# Patient Record
Sex: MALE | Race: WHITE | NOT HISPANIC OR LATINO | Employment: UNEMPLOYED | URBAN - METROPOLITAN AREA
[De-identification: names, ages, dates, MRNs, and addresses within clinical notes are randomized per-mention and may not be internally consistent; named-entity substitution may affect disease eponyms.]

---

## 2017-02-13 ENCOUNTER — ALLSCRIPTS OFFICE VISIT (OUTPATIENT)
Dept: OTHER | Facility: OTHER | Age: 52
End: 2017-02-13

## 2017-02-18 ENCOUNTER — LAB CONVERSION - ENCOUNTER (OUTPATIENT)
Dept: OTHER | Facility: OTHER | Age: 52
End: 2017-02-18

## 2017-02-18 LAB
A/G RATIO (HISTORICAL): 1.4 (CALC) (ref 1–2.5)
ALBUMIN SERPL BCP-MCNC: 4.2 G/DL (ref 3.6–5.1)
ALP SERPL-CCNC: 47 U/L (ref 40–115)
ALT SERPL W P-5'-P-CCNC: 31 U/L (ref 9–46)
AST SERPL W P-5'-P-CCNC: 28 U/L (ref 10–35)
BILIRUB SERPL-MCNC: 0.4 MG/DL (ref 0.2–1.2)
BUN SERPL-MCNC: 22 MG/DL (ref 7–25)
BUN/CREA RATIO (HISTORICAL): 16 (CALC) (ref 6–22)
CALCIUM SERPL-MCNC: 9 MG/DL (ref 8.6–10.3)
CHLORIDE SERPL-SCNC: 104 MMOL/L (ref 98–110)
CHOLEST SERPL-MCNC: 186 MG/DL (ref 125–200)
CHOLEST/HDLC SERPL: 4 (CALC)
CO2 SERPL-SCNC: 27 MMOL/L (ref 20–31)
COTININE (HISTORICAL): <2 NG/ML
CREAT SERPL-MCNC: 1.34 MG/DL (ref 0.7–1.33)
EGFR AFRICAN AMERICAN (HISTORICAL): 71 ML/MIN/1.73M2
EGFR-AMERICAN CALC (HISTORICAL): 61 ML/MIN/1.73M2
GAMMA GLOBULIN (HISTORICAL): 2.9 G/DL (CALC) (ref 1.9–3.7)
GLUCOSE (HISTORICAL): 92 MG/DL (ref 65–99)
HBA1C MFR BLD HPLC: 5.9 % OF TOTAL HGB
HDLC SERPL-MCNC: 46 MG/DL
LDL CHOLESTEROL (HISTORICAL): 118 MG/DL (CALC)
NICOTINE (HISTORICAL): <2 NG/ML
NON-HDL-CHOL (CHOL-HDL) (HISTORICAL): 140 MG/DL (CALC)
POTASSIUM SERPL-SCNC: 4.2 MMOL/L (ref 3.5–5.3)
PROSTATE SPECIFIC ANTIGEN TOTAL (HISTORICAL): 0.2 NG/ML
SODIUM SERPL-SCNC: 140 MMOL/L (ref 135–146)
TOTAL PROTEIN (HISTORICAL): 7.1 G/DL (ref 6.1–8.1)
TRIGL SERPL-MCNC: 108 MG/DL

## 2017-02-20 ENCOUNTER — GENERIC CONVERSION - ENCOUNTER (OUTPATIENT)
Dept: OTHER | Facility: OTHER | Age: 52
End: 2017-02-20

## 2017-07-12 ENCOUNTER — ALLSCRIPTS OFFICE VISIT (OUTPATIENT)
Dept: OTHER | Facility: OTHER | Age: 52
End: 2017-07-12

## 2017-07-12 DIAGNOSIS — M10.9 GOUT: ICD-10-CM

## 2017-07-12 DIAGNOSIS — E79.0 HYPERURICEMIA WITHOUT SIGNS OF INFLAMMATORY ARTHRITIS AND TOPHACEOUS DISEASE: ICD-10-CM

## 2018-01-13 NOTE — PROGRESS NOTES
Assessment    1  Encounter for preventive health examination (V70 0) (Z00 00)   2  Hyperuricemia (790 6) (E79 0)    Plan  Health Maintenance    · (LC) Cotinine Screen ONLY, Serum; Status:Active; Requested for:30Yjv0748;    · Always use a seat belt and shoulder strap when riding or driving a motor vehicle ;  Status:Complete;   Done: 72FYE5802   · Begin or continue regular aerobic exercise  Gradually work up to at least 3 sessions of 30  minutes of exercise a week ; Status:Complete;   Done: 52ASS5455   · Decreasing the stress in your life may help your condition improve ; Status:Complete;    Done: 99ALT6942   · Eat a normal well-balanced diet ; Status:Complete;   Done: 87RWS8621   · Regular aerobic exercise can help reduce stress ; Status:Complete;   Done: 87IEC1613   · Stretch and warm up your muscles during the first 10 minutes , then cool down your  muscles for the last 10 minutes of exercise ; Status:Complete;   Done: 36JZO7772   · Use a sun block product with an SPF of 15 or more ; Status:Complete;   Done:  56UPM7014   · We recommend a colonoscopy ; Status:Complete;   Done: 37IHW8592   · We recommend routine visits to a dentist ; Status:Complete;   Done: 26FQM1120   · Call [phone] if: You have any warning signs of skin cancer ; Status:Complete;    Done: 23MTP5029   · Call 911 if: You experience a new kind of chest pain (angina) or pressure ;  Status:Complete;   Done: 33BUW8999  Health Maintenance, Hyperuricemia, Prostate cancer screening    · (1) COMPREHENSIVE METABOLIC PANEL; Status:Active; Requested for:80Cwa3546;    · (Q) CBC (INCLUDES DIFF/PLT) WITH SMEAR REVIEW; Status:Active; Requested  for:65Xgy2775;    · (Q) HEMOGLOBIN A1c WITH eAG; Status:Active; Requested for:91Evw3681;    · (Q) LIPID PANEL WITH REFLEX TO DIRECT LDL; Status:Active; Requested  for:57Lea9581;    · (Q) PSA, TOTAL WITH REFLEX TO PSA, FREE; Status:Active; Requested  for:89Chr6903;    Health Maintenance, Hyperuricemia, Prostate cancer screening, Testosterone deficiency    · (Q) TESTOSTERONE,TOTAL,MALES; Status:Active; Requested for:32Dfg1533; Health Maintenance, Testosterone deficiency    · (Q) TSH, 3RD GENERATION W/REFLEX TO FT4; Status:Active; Requested  for:45Bnr5824; Check on Zostavax for shingles  He wants labs covered as part of a yearly physical  He doesn't want to schedule a colonoscopy  Chief Complaint  Physical      History of Present Illness  HM, Adult Male: The patient is being seen for a health maintenance evaluation  The last health maintenance visit was 1 5 year(s) ago  Social History: Household members include spouse and 4 children  He is   Work status: working full time  The patient has never smoked cigarettes  He reports occasional alcohol use  He has never used illicit drugs  General Health: The patient's health since the last visit is described as good  He has regular dental visits  He complains of vision problems  Vision care includes wearing reading glasses and Plans to make appt  He denies hearing loss  Immunizations status: up to date   Doesn't want a flu shot; discussed Zostavax  Lifestyle:  He consumes a diverse and healthy diet  He does not have any weight concerns  He exercises regularly  He exercises 3 or more times per week  Exercise includes aerobic conditioning and strength training  Reproductive health:  the patient is sexually active  He denies erectile dysfunction  Screening: Prostate cancer screening includes no previous evaluation  Testicular cancer screening includes monthly self testicular examinations  Colorectal cancer screening includes a colonoscopy within the past ten years  Metabolic screening includes lipid profile performed 12/14- good  Safety elements used: seat belt, bicycle helmet, safe driving habits, sunscreen, smoke detector and carbon monoxide detector  HPI: He saw Dr Anamaria Dong last year (urology) in [place] for erectile problems   He thinks he did a PSA  Had a renal ultrasound and CT  He had a low testosterone in the past but that wasn't rechecked  Review of Systems    Constitutional: not feeling poorly and not feeling tired  Eyes: no eye pain, no dryness of the eyes, eyes not red and no purulent discharge from the eyes    The patient presents with complaints of eyesight problems (Small print issues)  ENT: Recent URI- resolved  , but no earache  Cardiovascular: no chest pain, no palpitations and no extremity edema  Respiratory: no shortness of breath, no cough and no wheezing  Gastrointestinal: no abdominal pain, no constipation, no diarrhea and no blood in stools  Genitourinary: no dysuria, no urinary hesitancy, no genital lesions, no incontinence, no nocturia and no testicular pain  Musculoskeletal: No recent gout  , but no arthralgias, no joint swelling, no myalgias and no joint stiffness  Integumentary: no rashes and no skin lesions  Neurological: no headache, no dizziness and no fainting  Psychiatric: no anxiety and no depression  Endocrine: no muscle weakness  Hematologic/Lymphatic: no tendency for easy bleeding and no tendency for easy bruising  Active Problems    1  Allergic rhinitis (477 9) (J30 9)   2   Gout (274 9) (M10 9)    Past Medical History    · History of Male erectile dysfunction (607 84) (N52 9)   · History of Wrist fracture, left (814 00) (S62 102A)    Surgical History    · History of Knee Arthroscopy With Medial Meniscectomy   · History of Surgery Vas Deferens Vasectomy    Family History    · Family history of coronary artery disease (V17 3) (Z82 49)   · Family history of gout (V18 19) (Z82 69)   · Family history of hypertension (V17 49) (Z82 49)   · Family history of malignant neoplasm of uterus (V16 49) (Z80 49)    · Family history of gout (V18 19) (Z82 69)   · Family history of hypertension (V17 49) (Z82 49)    · Family history of coronary artery disease (V17 3) (Z82 49)   · Family history of diabetes mellitus (V18 0) (Z83 3)   · Family history of gout (V18 19) (Z82 69)   · Family history of hypertension (V17 49) (Z82 49)   · Family history of malignant neoplasm of uterus (V16 49) (Z80 49)    · Family history of gout (V18 19) (Z82 69)    Social History    · Education Level: Some college   ·    · 4 children   · Never a smoker   · Occupation   · Network tech   · Social alcohol use (Z78 9)    Current Meds   1  MegaRed Omega-3 Krill Oil 500 MG Oral Capsule; One daily; Therapy: 29Ukb3566 to Recorded   2  Multi-Vitamins Oral Tablet; TAKE 1 TABLET DAILY; Therapy: 34Szy1473 to Recorded    Allergies    1  No Known Drug Allergies    Vitals   Recorded: 49Saq7530 08:08AM   Temperature 97 7 F, Oral   Heart Rate 64, L Radial   Pulse Quality Norm   Respiration 14   Respiration Quality Norm   Systolic 396, LUE, Sitting   Diastolic 88, LUE, Sitting   Height 5 ft 10 5 in   Weight 219 lb 6 08 oz   BMI Calculated 31 03   BSA Calculated 2 19   O2 Saturation 97   Pain Scale 0     Physical Exam    Constitutional   General appearance: No acute distress, well appearing and well nourished  Head and Face   Head and face: Normal     Eyes   Conjunctiva and lids: No erythema, swelling or discharge  Pupils and irises: Equal, round, reactive to light  Ears, Nose, Mouth, and Throat   External inspection of ears and nose: Normal     Otoscopic examination: Tympanic membranes translucent with normal light reflex  Canals patent without erythema  Hearing: Normal     Nasal mucosa, septum, and turbinates: Normal without edema or erythema  Lips, teeth, and gums: Normal, good dentition  Oropharynx: Normal with no erythema, edema, exudate or lesions  Neck   Neck: Supple, symmetric, trachea midline, no masses  Thyroid: Normal, no thyromegaly  Pulmonary   Respiratory effort: No increased work of breathing or signs of respiratory distress  Auscultation of lungs: Clear to auscultation      Cardiovascular Palpation of heart: Normal PMI, no thrills  Auscultation of heart: Normal rate and rhythm, normal S1 and S2, no murmurs  Carotid pulses: 2+ bilaterally  no bruit heard over the right carotid and no bruit heard over the left carotid  Abdominal aorta: Normal     Femoral pulses: 2+ bilaterally  Pedal pulses: 2+ bilaterally  Examination of extremities for edema and/or varicosities: Normal     Abdomen   Abdomen: Non-tender, no masses  Liver and spleen: No hepatomegaly or splenomegaly  Examination for hernias: No hernias appreciated  Anus, perineum, and rectum: Normal sphincter tone, no masses, no prolapse  Genitourinary   Scrotal contents: Normal testes, no masses  Penis: Normal, no lesions  Examination of the circumcised penis showed  Digital rectal exam of prostate: Normal size, no masses  Lymphatic   Palpation of lymph nodes in neck: No lymphadenopathy  Palpation of lymph nodes in groin: No lymphadenopathy  Musculoskeletal   Gait and station: Normal     Inspection/palpation of digits and nails: Normal without clubbing or cyanosis  Inspection/palpation of joints, bones, and muscles: Normal     Range of motion: Normal     Skin   Skin and subcutaneous tissue: Normal without rashes or lesions  Neurologic   Cranial nerves: Cranial nerves 2-12 intact  Cortical function: Normal mental status  Reflexes: 2+ and symmetric  Psychiatric   Judgment and insight: Normal     Orientation to person, place and time: Normal     Recent and remote memory: Intact      Mood and affect: Normal        Procedure    Procedure: Visual Acuity Test    Results: 20/20 in both eyes without corrective device, 20/25 in the right eye without corrective device, 20/30 in the left eye without corrective device      Signatures   Electronically signed by : CARMENCITA Sheriff ; Feb 24 2016  8:47AM EST                       (Author)

## 2018-01-13 NOTE — RESULT NOTES
Message   please inform pt that labs look good -- work forms completed, will be faxed this week -- ok to mail results     Verified Results  (1) COMPREHENSIVE METABOLIC PANEL [id number] 04:41AN August Avina     Test Name Result Flag Reference   GLUCOSE 92 mg/dL  65-99   Fasting reference interval   UREA NITROGEN (BUN) 22 mg/dL  7-25   CREATININE 1 34 mg/dL H 0 70-1 33   For patients >52years of age, the reference limit  for Creatinine is approximately 13% higher for people  identified as -American  eGFR NON-AFR  AMERICAN 61 mL/min/1 73m2  > OR = 60   eGFR AFRICAN AMERICAN 71 mL/min/1 73m2  > OR = 60   BUN/CREATININE RATIO 16 (calc)  6-22   SODIUM 140 mmol/L  135-146   POTASSIUM 4 2 mmol/L  3 5-5 3   CHLORIDE 104 mmol/L     CARBON DIOXIDE 27 mmol/L  20-31   CALCIUM 9 0 mg/dL  8 6-10 3   PROTEIN, TOTAL 7 1 g/dL  6 1-8 1   ALBUMIN 4 2 g/dL  3 6-5 1   GLOBULIN 2 9 g/dL (calc)  1 9-3 7   ALBUMIN/GLOBULIN RATIO 1 4 (calc)  1 0-2 5   BILIRUBIN, TOTAL 0 4 mg/dL  0 2-1 2   ALKALINE PHOSPHATASE 47 U/L     AST 28 U/L  10-35   ALT 31 U/L  9-46     (1) LIPID PANEL, FASTING [id number] 09:27AM Miriam Dixon     Test Name Result Flag Reference   CHOLESTEROL, TOTAL 186 mg/dL  125-200   HDL CHOLESTEROL 46 mg/dL  > OR = 40   TRIGLICERIDES 257 mg/dL  <150   LDL-CHOLESTEROL 118 mg/dL (calc)  <130   Desirable range <100 mg/dL for patients with CHD or  diabetes and <70 mg/dL for diabetic patients with  known heart disease  CHOL/HDLC RATIO 4 0 (calc)  < OR = 5 0   NON HDL CHOLESTEROL 140 mg/dL (calc)     Target for non-HDL cholesterol is 30 mg/dL higher than   LDL cholesterol target  (1) PSA (SCREEN) (Dx V76 44 Screen for Prostate Cancer) [id number] 09:27AM August Living     Test Name Result Flag Reference   PSA, TOTAL 0 2 ng/mL  < OR = 4 0   This test was performed using the Siemens  chemiluminescent method  Values obtained from  different assay methods cannot be used  interchangeably   PSA levels, regardless of  value, should not be interpreted as absolute  evidence of the presence or absence of disease  NICOTINE AND COTININE, LC/MS/MS, SERUM/PLASMA [id number] 09:27AM Miriam Dixon     Test Name Result Flag Reference   NICOTINE, SERUM/PLASMA <2 ng/mL     COTININE, SERUM/PLASMA <2 ng/mL     Reference Range, Serum/Plasma(ng/mL):                                 Nicotine                               Smokers: 2-10                            Nonsmokers: <=4                                 Cotinine                               Smokers:                             Nonsmokers: <=8     Individuals exposed to second-hand or passive tobacco  smoke may demonstrate concentrations of nicotine and  cotinine greater than those indicated for non-smokers  (Q) CBC (H/H, RBC, INDICES, WBC, PLT) [id number] 09:27AM Miriam Dixon     Test Name Result Flag Reference   WHITE BLOOD CELL COUNT 4 7 Thousand/uL  3 8-10 8   RED BLOOD CELL COUNT 5 02 Million/uL  4 20-5 80   HEMOGLOBIN 14 6 g/dL  13 2-17 1   HEMATOCRIT 44 3 %  38 5-50 0   MCV 88 4 fL  80 0-100 0   MCH 29 0 pg  27 0-33 0   MCHC 32 8 g/dL  32 0-36 0   RDW 13 4 %  11 0-15 0   PLATELET COUNT 839 Thousand/uL  140-400   MPV 9 1 fL  7 5-12 5   WE RECEIVED YOUR HANDWRITTEN TEST ORDER AND  PERFORMED A HEMOGRAM WITH A PLATELET WITHOUT  A DIFFERENTIAL  IF THIS IS NOT WHAT YOU INTENDED  TO ORDER, PLEASE CONTACT YOUR LOCAL CLIENT SERVICE  REPRESENTATIVE IMMEDIATELY SO THAT WE CAN   ADJUST OUR BILLING APPROPRIATELY  YOU MAY ALSO   INQUIRE ABOUT ALTERNATIVE OR ADDITIONAL TESTING            (Q) HEMOGLOBIN A1c [id number] 09:27AM Miriam Dixon   REPORT COMMENT:  ON HOLD  FASTING:YES     Test Name Result Flag Reference   HEMOGLOBIN A1c 5 9 % of total Hgb H <5 7   According to ADA guidelines, hemoglobin A1c <7 0%  represents optimal control in non-pregnant diabetic  patients  Different metrics may apply to specific  patient populations  Standards of Medical Care in  [phone]   Diabetes Care  2013;36:s11-s66     For the purpose of screening for the presence of  diabetes  <5 7%       Consistent with the absence of diabetes  5 7-6 4%    Consistent with increased risk for diabetes              (prediabetes)  >or=6 5%    Consistent with diabetes     This assay result is consistent with an increased risk  of diabetes  Currently, no consensus exists for use of hemoglobin  A1c for diagnosis of diabetes for children

## 2018-01-14 VITALS
SYSTOLIC BLOOD PRESSURE: 154 MMHG | HEART RATE: 71 BPM | DIASTOLIC BLOOD PRESSURE: 90 MMHG | RESPIRATION RATE: 14 BRPM | WEIGHT: 221.31 LBS | TEMPERATURE: 97.8 F | BODY MASS INDEX: 29.97 KG/M2 | OXYGEN SATURATION: 99 % | HEIGHT: 72 IN

## 2018-01-14 NOTE — RESULT NOTES
Verified Results  NICOTINE AND EXPANDED METABOLITES,LCMSMS,SP [id number] 08:11AM Ferny Geronimo   REPORT COMMENT:  FASTING:NO     Test Name Result Flag Reference   NICOTINE, SERUM/PLASMA <2 ng/mL     COTININE, SERUM/PLASMA <2 ng/mL     3OH COTININE,SERUM/PLASMA <2 ng/mL     Individuals exposed to second hand or passive tobacco  smoke may demonstrate concentrations of nicotine and  metabolites greater than those indicated for non-  smokers  Reference Ranges: Active                        Non-Smoker     Tobacco User                         (ng/mL)          (ng/mL)           Nicotine        <4               2-10           Cotinine        <8                    3-OH-Cotinine        <2             100-500     (Q) CBC (INCLUDES DIFF/PLT) WITH SMEAR REVIEW [id number] 06:38AM Ferny Juanpablo     Test Name Result Flag Reference   WHITE BLOOD CELL COUNT 5 9 Thousand/uL  3 8-10 8   RED BLOOD CELL COUNT 5 05 Million/uL  4 20-5 80   HEMOGLOBIN 14 6 g/dL  13 2-17 1   HEMATOCRIT 45 3 %  38 5-50 0   MCV 89 6 fL  80 0-100 0   MCH 29 0 pg  27 0-33 0   MCHC 32 3 g/dL  32 0-36 0   RDW 13 1 %  11 0-15 0   PLATELET COUNT 021 Thousand/uL  140-400   MPV 9 1 fL  7 5-11 5   ABSOLUTE NEUTROPHILS 1121 cells/uL L 8799-5417   ABSOLUTE LYMPHOCYTES 4307 cells/uL H 850-3900   ABSOLUTE MONOCYTES 354 cells/uL  200-950   ABSOLUTE EOSINOPHILS 0 cells/uL L    ABSOLUTE BASOPHILS 118 cells/uL  0-200   NEUTROPHILS 19 0 %     LYMPHOCYTES 73 0 %     MONOCYTES 6 0 %     EOSINOPHILS 0 %     BASOPHILS 2 0 %       (1) COMPREHENSIVE METABOLIC PANEL [id number] 37:43SU Ferny Geronimo     Test Name Result Flag Reference   GLUCOSE 84 mg/dL  65-99   Fasting reference interval   UREA NITROGEN (BUN) 15 mg/dL  7-25   CREATININE 1 19 mg/dL  0 70-1 33   For patients >52years of age, the reference limit  for Creatinine is approximately 13% higher for people  identified as -American  eGFR NON-AFR   AMERICAN 71 mL/min/1 73m2  > OR = 60   eGFR AFRICAN AMERICAN 82 mL/min/1 73m2  > OR = 60   BUN/CREATININE RATIO   7-93   NOT APPLICABLE (calc)   SODIUM 138 mmol/L  135-146   POTASSIUM 4 2 mmol/L  3 5-5 3   CHLORIDE 101 mmol/L     CARBON DIOXIDE 26 mmol/L  19-30   CALCIUM 9 0 mg/dL  8 6-10 3   PROTEIN, TOTAL 7 0 g/dL  6 1-8 1   ALBUMIN 4 3 g/dL  3 6-5 1   GLOBULIN 2 7 g/dL (calc)  1 9-3 7   ALBUMIN/GLOBULIN RATIO 1 6 (calc)  1 0-2 5   BILIRUBIN, TOTAL 0 7 mg/dL  0 2-1 2   ALKALINE PHOSPHATASE 43 U/L     AST 22 U/L  10-35   ALT 31 U/L  9-46     (Q) HEMOGLOBIN A1c WITH eAG [id number] 06:38AM Lauro Amaro   REPORT COMMENT:  FASTING:NO     Test Name Result Flag Reference   HEMOGLOBIN A1c 5 7 % of total Hgb H <5 7   According to ADA guidelines, hemoglobin A1c <7 0%  represents optimal control in non-pregnant diabetic  patients  Different metrics may apply to specific  patient populations  Standards of Medical Care in    Diabetes Care  2013;36:s11-s66     For the purpose of screening for the presence of  diabetes  <5 7%       Consistent with the absence of diabetes  5 7-6 4%    Consistent with increased risk for diabetes              (prediabetes)  >or=6 5%    Consistent with diabetes     This assay result is consistent with an increased risk  of diabetes  Currently, no consensus exists for use of hemoglobin  A1c for diagnosis of diabetes for children  eAG (mg/dL) 117 (calc)     eAG (mmol/L) 6 5 (calc)       (Q) LIPID PANEL WITH REFLEX TO DIRECT LDL [id number] 06:38AM Lauro Arrpretty     Test Name Result Flag Reference   CHOLESTEROL, TOTAL 188 mg/dL  125-200   HDL CHOLESTEROL 46 mg/dL  > OR = 40   TRIGLICERIDES 954 mg/dL  <150   LDL-CHOLESTEROL 120 mg/dL (calc)  <130   Desirable range <100 mg/dL for patients with CHD or  diabetes and <70 mg/dL for diabetic patients with  known heart disease     CHOL/HDLC RATIO 4 1 (calc)  < OR = 5 0   NON HDL CHOLESTEROL 142 mg/dL (calc)     Target for non-HDL cholesterol is 30 mg/dL higher than   LDL cholesterol target  (Q) PSA, TOTAL WITH REFLEX TO PSA, FREE [id number] 06:38AM Lonne Eusebio     Test Name Result Flag Reference   TOTAL PSA 0 3 ng/mL  < OR = 4 0   This test was performed using the Tracy Eileen  immunoassay method  Values obtained with other assay  methods cannot be used interchangeably  PSA levels,  regardless of value, should not be interpreted as  absolute evidence of the presence or absence of disease  (Q) Malachi Niño [id number] 06:38AM Lonne Eusebio     Test Name Result Flag Reference   TESTOSTERONE,TOTAL,MALES 214 ng/dL L 449-916   Men with clinically significant hypogonadal symptoms  and testosterone values repeatedly less than   approximately 300 ng/dL may benefit from testosterone   treatment after adequate risk and benefits counseling  In hypogonadal males, Testosterone, Total, LC/MS/MS,  is the recommended assay due to the diminished  accuracy of immunoassay at levels below 250 ng/dL  This test code (83821) must be collected in a  red-top tube with no gel  Two morning (8-10 a m )  specimens obtained on different days are recommended  by The [employer] Society for screening for  hypogonadism  (Q) TSH, 3RD GENERATION W/REFLEX TO FT4 [id number] 06:38AM Lonne Eusebio     Test Name Result Flag Reference   TSH W/REFLEX TO FT4 2 88 mIU/L  0 40-4 50     (1) URIC ACID [id number] 06:38AM Lonne Eusebio     Test Name Result Flag Reference   URIC ACID 10 0 mg/dL H 4 0-8 0   Therapeutic target for gout patients: <6 0 mg/dL       Plan  Hyperuricemia    · Allopurinol 300 MG Oral Tablet; TAKE 1 TABLET DAILY   · (1) URIC ACID; Status:Active; Requested FUD:61FSH6745;   Screen for colon cancer    · COLONOSCOPY; Status:Active;  Requested for:02Mar2016;    · *1 - 209 [address] Physician Referral  Consult   Status: Active  Requested for: 56VNR0295  Care Summary provided  : Yes  Testosterone deficiency    · 1 Susan Cortez MD, [facility] (Endocrinology) Physician Referral  Consult  Status: Active   Requested for: 22JZK0420  Care Summary provided  : Yes

## 2018-01-15 NOTE — RESULT NOTES
Message   These labs will be discussed in the office visit  Please call [facility] and make sure they are running the testosterone levels as ordered  Verified Results  (1) ADRENOCORTICOTROPHIN [id number] 02:30PM [facility]     Test Name Result Flag Reference   ACTH, PLASMA 16 pg/mL  6-50   Reference range applies only to specimens collected           between 7-10 AM      (1) CORTISOL AM SPECIMEN [id number] 02:30PM [facility]     Test Name Result Flag Reference   CORTISOL, A M  7 6 mcg/dL     Reference Range  8 a m  (7-9 a m ) Specimen: 4 0-22 0     (1) DHEA-S [id number] 02:30PM [facility]     Test Name Result Flag Reference   DHEA SULFATE 141 mcg/dL     DHEA-S values fall with advancing age  For reference, the reference intervals for 32-40 year  old patients are:     Male:   106-464 mcg/dL  Female:   mcg/dL     (1) [facility] [id number] 02:30PM [facility]     Test Name Result Flag Reference   [facility] 5 2 mIU/mL  1 6-8 0     (1) RENAL FUNCTION PANEL [id number] 02:30PM [facility]     Test Name Result Flag Reference   GLUCOSE 148 mg/dL H 65-99   Fasting reference interval   UREA NITROGEN (BUN) 17 mg/dL  7-25   CREATININE 1 21 mg/dL  0 70-1 33   For patients >52years of age, the reference limit  for Creatinine is approximately 13% higher for people  identified as -American  eGFR NON-AFR   AMERICAN 69 mL/min/1 73m2  > OR = 60   eGFR AFRICAN AMERICAN 80 mL/min/1 73m2  > OR = 60   BUN/CREATININE RATIO   7-87   NOT APPLICABLE (calc)   SODIUM 137 mmol/L  135-146   POTASSIUM 3 8 mmol/L  3 5-5 3   CHLORIDE 102 mmol/L     CARBON DIOXIDE 21 mmol/L  19-30   CALCIUM 9 2 mg/dL  8 6-10 3   PHOSPHATE (AS PHOSPHORUS) 3 8 mg/dL  2 5-4 5   ALBUMIN 4 3 g/dL  3 6-5 1     (1) CBC/PLT/DIFF [id number] 02:30PM [facility]     Test Name Result Flag Reference   WHITE BLOOD CELL COUNT 5 1 Thousand/uL  3 8-10 8   RED BLOOD CELL COUNT 5 05 Million/uL  4 20-5 80   HEMOGLOBIN 14 6 g/dL  13 2-17 1   HEMATOCRIT 45 6 % 38 5-50 0   MCV 90 4 fL  80 0-100 0   MCH 28 9 pg  27 0-33 0   MCHC 32 0 g/dL  32 0-36 0   RDW 13 8 %  11 0-15 0   PLATELET COUNT 742 Thousand/uL  140-400   MPV 8 8 fL  7 5-11 5   ABSOLUTE NEUTROPHILS 2851 cells/uL  0208-8541   ABSOLUTE LYMPHOCYTES 1948 cells/uL  850-3900   ABSOLUTE MONOCYTES 199 cells/uL L 200-950   ABSOLUTE EOSINOPHILS 77 cells/uL     ABSOLUTE BASOPHILS 26 cells/uL  0-200   NEUTROPHILS 55 9 %     LYMPHOCYTES 38 2 %     MONOCYTES 3 9 %     EOSINOPHILS 1 5 %     BASOPHILS 0 5 %       (1) IRON [id number] 02:30PM [facility]      Test Name Result Flag Reference   IRON, TOTAL 92 mcg/dL       (Q) SEX HORMONE BINDING GLOBULIN [id number] 02:30PM [facility]      Test Name Result Flag Reference   SEX HORMONE BINDING GLOBULIN 24 nmol/L  10-50     (Q) PTH, INTACT AND CALCIUM [id number] 02:30PM [facility]      Test Name Result Flag Reference   PARATHYROID HORMONE,$INTACT 68 pg/mL H 14-64   Interpretive Guide    Intact PTH           Calcium  ------------------    ----------           -------  Normal Parathyroid    Normal               Normal  Hypoparathyroidism    Low or Low Normal    Low  Hyperparathyroidism     Primary            Normal or High       High     Secondary          High                 Normal or Low     Tertiary           High                 High  Non-Parathyroid     Hypercalcemia      Low or Low Normal    High   CALCIUM 9 2 mg/dL  8 6-10 3     (1) OP FRITZ FERRITIN [id number] 02:30PM [facility]      Test Name Result Flag Reference   FERRITIN 170 ng/mL       (Q) [address] [id number] 02:30PM [facility]      Test Name Result Flag Reference   LH 3 0 mIU/mL  1 5-9 3     (Q) PROLACTIN [id number] 02:30PM [facility]      Test Name Result Flag Reference   PROLACTIN 6 8 ng/mL  2 0-18 0     (1) T4, FREE [id number] 02:30PM [facility]      Test Name Result Flag Reference   T4, FREE 1 0 ng/dL  0 8-1 8     (Q) TSH, 3RD GENERATION [id number] 02:30PM [facility]      Test Name Result Flag Reference   TSH 1 16 mIU/L  0 40-4 50     *(Q) VITAMIN D, 25-HYDROXY, LC/MS/MS [id number] 02:30PM [facility]   REPORT COMMENT:  FOR REQ #S [id number] AND [id number]  FASTING:YES     Test Name Result Flag Reference   VITAMIN D, 25-OH, TOTAL 28 ng/mL L    Vitamin D Status         25-OH Vitamin D:     Deficiency:                    <20 ng/mL  Insufficiency:             20 - 29 ng/mL  Optimal:                 > or = 30 ng/mL     For 25-OH Vitamin D testing on patients on   D2-supplementation and patients for whom quantitation   of D2 and D3 fractions is required, the QuestAssureD(TM)  25-OH VIT D, (D2,D3), LC/MS/MS is recommended: order   code 83120 (patients >2yrs)  For more information on this test, go to:  http://[employer]/faq/QMW177  (This link is being provided for   informational/educational purposes only )     (Q) IGF I, LC/MS [id number] 02:30PM Damaris Morales     Test Name Result Flag Reference   IGF I, LC/ ng/mL     Z SCORE (MALE) 0 8 SD  -2 0 - +2  0   This test was developed and its analytical performance  characteristics have been determined by [facility]  It has not been  cleared or approved by FDA  This assay has been validated  pursuant to the CLIA regulations and is used for clinical  purposes       (Q) PTH, INTACT AND CALCIUM [id number] 02:30PM [facility] Mor[facility]     Test Name Result Flag Reference   PARATHYROID HORMONE,$INTACT 68 pg/mL H 14-64   Interpretive Guide    Intact PTH           Calcium  ------------------    ----------           -------  Normal Parathyroid    Normal               Normal  Hypoparathyroidism    Low or Low Normal    Low  Hyperparathyroidism     Primary            Normal or High       High     Secondary          High                 Normal or Low     Tertiary           High                 High  Non-Parathyroid     Hypercalcemia      Low or Low Normal    High   CALCIUM 9 2 mg/dL  8 6-10 3     (Q) IGF I, LC/MS [id number] 02:30PM Bev[facility] Sayres     Test Name Result Flag Reference   IGF I, LC/ ng/mL     Z SCORE (MALE) 0 8 SD  -2 0 - +2  0   This test was developed and its analytical performance  characteristics have been determined by [facility]  It has not been  cleared or approved by FDA  This assay has been validated  pursuant to the CLIA regulations and is used for clinical  purposes  (1) OP FRITZ FERRITIN [id number] 02:30PM [facility]evelyn Sayres     Test Name Result Flag Reference   FERRITIN 170 ng/mL       (Q) LH [id number] 02:30PM [facility] Sayres     Test Name Result Flag Reference   LH 3 0 mIU/mL  1 5-9 3     (Q) PROLACTIN [id number] 02:30PM [facility] Say[facility]     Test Name Result Flag Reference   PROLACTIN 6 8 ng/mL  2 0-18 0     (1) T4, FREE [id number] 02:30PM [facility] Sayres     Test Name Result Flag Reference   T4, FREE 1 0 ng/dL  0 8-1 8     (Q) TSH, 3RD GENERATION [id number] 02:30PM [facility]ev[facility] Sayres     Test Name Result Flag Reference   TSH 1 16 mIU/L  0 40-4 50     *(Q) VITAMIN D, 25-HYDROXY, LC/MS/MS [id number] 02:30PM [facility]ev[facility] Santanares   REPORT COMMENT:  FOR REQ #S [id number] AND [id number]  FASTING:YES     Test Name Result Flag Reference   VITAMIN D, 25-OH, TOTAL 28 ng/mL L    Vitamin D Status         25-OH Vitamin D:     Deficiency:                    <20 ng/mL  Insufficiency:             20 - 29 ng/mL  Optimal:                 > or = 30 ng/mL     For 25-OH Vitamin D testing on patients on   D2-supplementation and patients for whom quantitation   of D2 and D3 fractions is required, the QuestAssureD(TM)  25-OH VIT D, (D2,D3), LC/MS/MS is recommended: order   code 80414 (patients >2yrs)  For more information on this test, go to:  http://education  [employer]/faq/ENB304  (This link is being provided for   informational/educational purposes only )

## 2018-01-17 NOTE — PROGRESS NOTES
Assessment    1  Encounter for preventive health examination (V70 0) (Z00 00)   2  Hyperuricemia (790 6) (E79 0)   3  Prostate cancer screening (V76 44) (Z12 5)    Plan  Health Maintenance    · (Q) NICOTINE AND METABOLITE, SERUM/PLASMA; Status:Active; Requested  for:22Yoa8025; Health Maintenance, Hyperuricemia    · (1) CBC/ PLT (NO DIFF); Status:Active; Requested for:64Qwi0796;    · (1) COMPREHENSIVE METABOLIC PANEL; Status:Active; Requested for:34Ctf9859;    · (1) HEMOGLOBIN A1C; Status:Active; Requested for:34Bot2227;    · (1) LIPID PANEL, FASTING; Status:Active; Requested for:13Feb2017;   Prostate cancer screening    · (1) PSA (SCREEN) (Dx V76 44 Screen for Prostate Cancer); Status:Active; Requested  for:04Vgy6497;     Discussion/Summary  Impression: health maintenance visit, healthy adult male  Currently, he eats a healthy diet and has an adequate exercise regimen  Prostate cancer screening: the risks and benefits of prostate cancer screening were discussed and PSA was ordered  Colorectal cancer screening: the risks and benefits of colorectal cancer screening were discussed and colorectal cancer screening is current  Screening lab work includes glucose and lipid profile  The immunizations are up to date  He was advised to be evaluated by an optometrist and a dentist  Advice and education were given regarding nutrition, aerobic exercise, weight bearing exercise, calcium supplements, vitamin D supplements, cardiovascular risk reduction, sunscreen use, self skin examination and seat belt use  Patient discussion: discussed with the patient  The treatment plan was reviewed with the patient/guardian  The patient/guardian understands and agrees with the treatment plan   The patient was counseled regarding diagnostic results, instructions for management, risk factor reductions, prognosis, patient and family education, impressions, risks and benefits of treatment options, importance of compliance with treatment  Chief Complaint  47 YEAR WELL      History of Present Illness  HM, Adult Male: The patient is being seen for a health maintenance evaluation  The last health maintenance visit was <1 yr year(s) ago  Social History: Household members include spouse, 1 daughter(s) and 1 son(s)  He is   Work status: working full time  The patient has never smoked cigarettes  He reports drinking 1-2 drinks per week  He has never used illicit drugs  General Health: The patient's health since the last visit is described as good  He has regular dental visits  The patient reports his last dental visit was 1/2017  He complains of vision problems  Vision care includes wearing reading glasses and having regular eye examinations  He denies hearing loss  Immunizations status: up to date  Lifestyle:  He consumes a diverse and healthy diet  (Healthy, diverse -- protein from lean meat/fish -- low carb)   He exercises regularly  He exercises 4-5 times per week for 45-60 minutes per session  Exercise includes aerobic conditioning and strength training  Reproductive health:  the patient is sexually active  He denies erectile dysfunction  Screening: cancer screening reviewed and current  Prostate cancer screening includes prostate-specific antigen testing last year  Colorectal cancer screening includes no previous screening  metabolic screening reviewed and current  risk screening reviewed and current  HPI: Has fitness forms from employer with requested labs      Review of Systems    Constitutional: No fever or chills, feels well, no tiredness, no recent weight gain or weight loss  Eyes: no eye pain and no purulent discharge from the eyes  ENT: no earache, no sore throat and no nasal discharge  Cardiovascular: no chest pain and no palpitations  Respiratory: no shortness of breath, no cough and no wheezing     Gastrointestinal: No complaints of abdominal pain, no constipation, no nausea or vomiting, no diarrhea or bloody stools  Genitourinary: no dysuria and no incontinence  Musculoskeletal: No complaints of arthralgia, no myalgias, no joint swelling or stiffness, no limb pain or swelling  Integumentary: no rashes and no skin lesions  Neurological: no headache and no confusion  Psychiatric: no anxiety and no depression  Endocrine: no hot flashes and no erectile dysfunction  Hematologic/Lymphatic: no swollen glands, no tendency for easy bleeding and no tendency for easy bruising  Active Problems    1  Allergic rhinitis (477 9) (J30 9)   2  Decreased libido (799 81) (R68 82)   3  Gout (274 9) (M10 9)   4  Hyperuricemia (790 6) (E79 0)   5  Malaise and fatigue (780 79) (R53 81,R53 83)   6  Mild vitamin D deficiency (268 9) (E55 9)   7  Prostate cancer screening (V76 44) (Z12 5)   8  Right knee pain (719 46) (M25 561)   9  Screen for colon cancer (V76 51) (Z12 11)   10  Tear of medial meniscus of right knee (836 0) (S83 241A)   11   Testosterone deficiency (257 2) (E29 1)    Past Medical History    · History of Colon cancer screening (V76 51) (Z12 11)   · History of Male erectile dysfunction (607 84) (N52 9)   · History of Refused influenza vaccine (V64 06) (Z28 21)   · History of Wrist fracture, left (814 00) (S62 102A)    Surgical History    · History of Knee Arthroscopy With Medial Meniscectomy   · History of Surgery Vas Deferens Vasectomy    Family History  Mother    · Family history of coronary artery disease (V17 3) (Z82 49)   · Family history of gout (V18 19) (Z82 69)   · Family history of hypertension (V17 49) (Z82 49)   · Family history of malignant neoplasm of uterus (V16 49) (Z80 49)  Father    · Family history of gout (V18 19) (Z82 69)   · Family history of hypertension (V17 49) (Z82 49)  Daughter    · Family history of coronary artery disease (V17 3) (Z82 49)   · Family history of diabetes mellitus (V18 0) (Z83 3)   · Family history of gout (V18 19) (Z82 69)   · Family history of hypertension (V17 49) (Z82 49)   · Family history of malignant neoplasm of uterus (V16 49) (Z80 49)  Brother    · Family history of gout (V18 19) (Z82 69)    Social History    · Education Level: Some college   ·    · 4 children   · Never a smoker   · Occupation   · Network tech   · Social alcohol use (Z78 9)    Current Meds   1  Allopurinol 300 MG Oral Tablet; TAKE 1 TABLET DAILY; Therapy: 64ZMN4519 to (Evaluate:74Jnt4353)  Requested for: 63JNV5352; Last   Rx:74Lyv2333 Ordered   2  Vitamin D3 2000 UNIT Oral Capsule; take 1 tablet by mouth once daily; Therapy: 14CNN7864 to (Last Rx:17Std8918)  Requested for: 79YJR4193 Ordered    Allergies    1  No Known Drug Allergies    Vitals   Recorded: 56TYY6765 01:06PM   Temperature 98 1 F   Heart Rate 62   Respiration 15   Systolic 683   Diastolic 82   Height 5 ft 11 5 in   Weight 223 lb    BMI Calculated 30 67   BSA Calculated 2 22   O2 Saturation 98     Physical Exam    Constitutional   General appearance: No acute distress, well appearing and well nourished  Head and Face   Head and face: Normal     Palpation of the face and sinuses: No sinus tenderness  Eyes   Conjunctiva and lids: No erythema, swelling or discharge  Pupils and irises: Equal, round, reactive to light  Ears, Nose, Mouth, and Throat   External inspection of ears and nose: Normal     Otoscopic examination: Tympanic membranes translucent with normal light reflex  Canals patent without erythema  Hearing: Normal     Nasal mucosa, septum, and turbinates: Normal without edema or erythema  Lips, teeth, and gums: Normal, good dentition  Oropharynx: Normal with no erythema, edema, exudate or lesions  Neck   Neck: Supple, symmetric, trachea midline, no masses  Thyroid: Normal, no thyromegaly  Pulmonary   Respiratory effort: No increased work of breathing or signs of respiratory distress  Auscultation of lungs: Clear to auscultation      Cardiovascular   Auscultation of heart: Normal rate and rhythm, normal S1 and S2, no murmurs  Carotid pulses: 2+ bilaterally  Examination of extremities for edema and/or varicosities: Normal     Chest   Chest: Normal     Abdomen   Abdomen: Non-tender, no masses  Liver and spleen: No hepatomegaly or splenomegaly  Lymphatic   Palpation of lymph nodes in neck: No lymphadenopathy  Musculoskeletal   Gait and station: Normal     Inspection/palpation of digits and nails: Normal without clubbing or cyanosis  Inspection/palpation of joints, bones, and muscles: Normal     Range of motion: Normal     Stability: Normal     Muscle strength/tone: Normal     Skin   Skin and subcutaneous tissue: Normal without rashes or lesions  Neurologic   Cranial nerves: Cranial nerves 2-12 intact  Reflexes: 2+ and symmetric  Sensation: No sensory loss  Psychiatric   Orientation to person, place and time: Normal     Mood and affect: Normal        Health Management  Screen for colon cancer   COLONOSCOPY (GI, SURG); every 10 years; Last 91RPA1876; Next Due: 15ASB8146;   Active    Signatures   Electronically signed by : Nuria Mcgovern DO; Feb 13 2017  1:41PM EST                       (Author)

## 2018-01-17 NOTE — PROGRESS NOTES
Assessment    1  Tear of medial meniscus of right knee (836 0) (R28 016E)    Discussion/Summary    Right knee medial meniscus tear  Reviewed treatment options with patient, including surgical and nonsurgical options  Patient wants to pursue surgery, which he had on his left knee in the past  I reviewed the risks and benefits of the procedure, including potential for recurrent tear  He understands and wishes to proceed  Follow-up 10-14 days after surgery  Chief Complaint    1  Knee Pain    History of Present Illness  HPI: Follow-up right knee  Patient reports approximately 2 months of right medial sided knee pain with no significant improvement  No significant change in history since his last visit on 8/1/16  Pain started after kicking some debris out of his path while jogging  He returns to review his MRI results and discuss further treatment options  The past medical history, past surgical history, medications, allergies, social history, and family history were reviewed and updated today  Review of Systems    Constitutional: No fever or chills, feels well, no tiredness, no recent weight loss or weight gain  Eyes: No complaints of red eyes, no eyesight problems  ENT: no complaints of loss of hearing, no nosebleeds, no sore throat  Cardiovascular: No complaints of chest pain, no palpitations, no leg claudication or lower extremity edema  Respiratory: No complaints of shortness of breath, no wheezing, no cough  Gastrointestinal: No complaints of abdominal pain, no constipation, no nausea or vomiting, no diarrhea or bloody stools  Genitourinary: No complaints of dysuria or incontinence, no hesitancy, no nocturia  Musculoskeletal: as noted in HPI  Integumentary: No complaints of skin rash or lesion, no itching or dry skin, no skin wounds  Neurological: No complaints of headache, no confusion, no numbness or tingling, no dizziness     Psychiatric: No suicidal thoughts, no anxiety, no depression  Endocrine: No muscle weakness, no frequent urination, no excessive thirst, no feelings of weakness  Active Problems    1  Allergic rhinitis (477 9) (J30 9)   2  Decreased libido (799 81) (R68 82)   3  Gout (274 9) (M10 9)   4  Hyperuricemia (790 6) (E79 0)   5  Malaise and fatigue (780 79) (R53 81,R53 83)   6  Mild vitamin D deficiency (268 9) (E55 9)   7  Prostate cancer screening (V76 44) (Z12 5)   8  Right knee pain (719 46) (M25 561)   9  Screen for colon cancer (V76 51) (Z12 11)   10  Testosterone deficiency (257 2) (E29 1)    Past Medical History    · History of Colon cancer screening (V76 51) (Z12 11)   · History of Male erectile dysfunction (607 84) (N52 9)   · History of Refused influenza vaccine (V64 06) (Z28 21)   · History of Wrist fracture, left (814 00) (S62 102A)    Surgical History    · History of Knee Arthroscopy With Medial Meniscectomy   · History of Surgery Vas Deferens Vasectomy    Family History  Mother    · Family history of coronary artery disease (V17 3) (Z82 49)   · Family history of gout (V18 19) (Z82 69)   · Family history of hypertension (V17 49) (Z82 49)   · Family history of malignant neoplasm of uterus (V16 49) (Z80 49)  Father    · Family history of gout (V18 19) (Z82 69)   · Family history of hypertension (V17 49) (Z82 49)  Daughter    · Family history of coronary artery disease (V17 3) (Z82 49)   · Family history of diabetes mellitus (V18 0) (Z83 3)   · Family history of gout (V18 19) (Z82 69)   · Family history of hypertension (V17 49) (Z82 49)   · Family history of malignant neoplasm of uterus (V16 49) (Z80 49)  Brother    · Family history of gout (V18 19) (Z82 69)    Social History    · Education Level: Some college   ·    · 4 children   · Never a smoker   · Occupation   · Network tech   · Social alcohol use (Z78 9)    Current Meds   1  Allopurinol 300 MG Oral Tablet; TAKE 1 TABLET DAILY;    Therapy: 77JCF7273 to (Evaluate:88Ces7837)  Requested for: 03OIX8483; Last   Rx:52Fmq9492 Ordered   2  MegaRed Omega-3 Krill Oil 500 MG Oral Capsule; One daily; Therapy: 44Dcx9215 to Recorded   3  Multi-Vitamins Oral Tablet; TAKE 1 TABLET DAILY; Therapy: 00Pjg4723 to Recorded   4  Vitamin D3 2000 UNIT Oral Capsule; take 1 tablet by mouth once daily; Therapy: 09JPV1772 to (Last Rx:02Yev5308)  Requested for: 39IPN1734 Ordered    Allergies    1  No Known Drug Allergies    Vitals  Signs    Systolic: 324  Diastolic: 77  Heart Rate: 59  Height: 5 ft 10 5 in  Weight: 218 lb 6 08 oz  BMI Calculated: 30 89  BSA Calculated: 2 18    Physical Exam  Right knee: Trace effusion  Medial joint line tenderness  Full range of motion  Stable with varus and valgus stress  Positive Diego's test    Constitutional - General appearance: Normal    Musculoskeletal - Digits and nails: Normal  Muscle strength/tone: Normal    Lungs - Respiratory effort: Normal    Cardiovascular - Examination of extremities for edema and/or varicosities: Normal    Skin - Skin and subcutaneous tissue: Normal    Neurologic - Sensation: Normal    Psychiatric - Mood and affect: Normal    Eyes   Conjunctiva and lids: Normal        Results/Data  * MRI KNEE RIGHT  WO CONTRAST 45Vfc2607 01:42PM Rashawn Dock Order Number: [id number]   Performing Comments: Evaluate medial meniscus   - Patient Instructions: To schedule this appointment, please contact Central Scheduling at [phone]    8/9 @@ [phone]   PLEASE ARRIVE 15 MIN EARLY TO    [facility]  [facility] [place] [facility] AND INSURANCE CARDS   BRING PHOTO ID AND INSURANCE CARDS     Test Name Result Flag Reference   MRI KNEE RIGHT  WO CONTRAST (Report)     MRI RIGHT KNEE     INDICATION: Right knee pain  Evaluate medial meniscus  Patient states that he had a sewing needle injury to the right knee more than 40 years ago  COMPARISON: Right knee plain films from 8/1/2016       TECHNIQUE:  MR sequences were obtained of the right knee including: Localizer, axial T2 fat sat, coronal T1/T2 fat sat, sagittal PD/T2 fat sat  Images were acquired on a 1 5 Denita unit  Gadolinium was not used  FINDINGS:     SUBCUTANEOUS TISSUES: Normal     JOINT EFFUSION: None  BAKER'S CYST: None  MENISCI: There is a radial tear through the medial meniscus posterior horn near the posterior meniscal root (series 7 image 20  ) The lateral meniscus is normal      CRUCIATE LIGAMENTS: Intact  EXTENSOR APPARATUS: Intact  COLLATERAL LIGAMENTS: Intact  ARTICULAR SURFACES: Intact  BONE MARROW: There is a focus of susceptibility artifact in the anterolateral aspect of the proximal tibial metaphysis, without a plain film correlate  This is probably sequela of patient's needle injury  4 years ago  MUSCULATURE: Intact  IMPRESSION:     There is a radial tear through the medial meniscus posterior horn near the posterior meniscal root (series 7 image 20 )        Workstation performed: [id number]     Signed by:   Isa Mata MD   8/10/16     I personally reviewed the films/images/results in the office today  My interpretation follows  MRI Review Right knee 8/10/16: Radial tear of the posterior horn of the medial meniscus near the posterior meniscal root  Future Appointments    Date/Time Provider Specialty Site   08/18/2016 03:15 PM CARMENCITA Munoz  [address] [place] [address] [place]   08/30/2016 10:50 AM CARMENCITA Munoz   Orthopedic [address] [place] [address]     Signatures   Electronically signed by : CARMENCITA Macario ; Aug 10 2016  4:29PM EST                       (Author)

## 2018-01-22 VITALS
TEMPERATURE: 98.1 F | HEIGHT: 72 IN | OXYGEN SATURATION: 98 % | WEIGHT: 223 LBS | RESPIRATION RATE: 15 BRPM | HEART RATE: 62 BPM | SYSTOLIC BLOOD PRESSURE: 138 MMHG | BODY MASS INDEX: 30.2 KG/M2 | DIASTOLIC BLOOD PRESSURE: 82 MMHG

## 2018-05-21 DIAGNOSIS — M1A.9XX0 CHRONIC GOUT WITHOUT TOPHUS, UNSPECIFIED CAUSE, UNSPECIFIED SITE: Primary | ICD-10-CM

## 2018-05-21 RX ORDER — ALLOPURINOL 300 MG/1
1 TABLET ORAL DAILY
COMMUNITY
Start: 2016-03-02 | End: 2018-05-21 | Stop reason: SDUPTHER

## 2018-05-22 ENCOUNTER — TELEPHONE (OUTPATIENT)
Dept: FAMILY MEDICINE CLINIC | Facility: OTHER | Age: 53
End: 2018-05-22

## 2018-05-22 RX ORDER — ALLOPURINOL 300 MG/1
300 TABLET ORAL DAILY
Qty: 90 TABLET | Refills: 0 | Status: SHIPPED | OUTPATIENT
Start: 2018-05-22 | End: 2018-05-31 | Stop reason: SDUPTHER

## 2018-05-31 ENCOUNTER — OFFICE VISIT (OUTPATIENT)
Dept: FAMILY MEDICINE CLINIC | Facility: OTHER | Age: 53
End: 2018-05-31
Payer: COMMERCIAL

## 2018-05-31 VITALS
HEIGHT: 71 IN | TEMPERATURE: 97.6 F | WEIGHT: 213.25 LBS | BODY MASS INDEX: 29.85 KG/M2 | HEART RATE: 72 BPM | SYSTOLIC BLOOD PRESSURE: 124 MMHG | DIASTOLIC BLOOD PRESSURE: 74 MMHG | OXYGEN SATURATION: 97 %

## 2018-05-31 DIAGNOSIS — E79.0 HYPERURICEMIA: ICD-10-CM

## 2018-05-31 DIAGNOSIS — Z13.1 SCREENING FOR DIABETES MELLITUS: ICD-10-CM

## 2018-05-31 DIAGNOSIS — Z13.220 SCREENING FOR LIPID DISORDERS: ICD-10-CM

## 2018-05-31 DIAGNOSIS — M1A.9XX0 CHRONIC GOUT WITHOUT TOPHUS, UNSPECIFIED CAUSE, UNSPECIFIED SITE: Primary | ICD-10-CM

## 2018-05-31 DIAGNOSIS — E55.9 MILD VITAMIN D DEFICIENCY: ICD-10-CM

## 2018-05-31 DIAGNOSIS — R53.81 MALAISE AND FATIGUE: ICD-10-CM

## 2018-05-31 DIAGNOSIS — R53.83 MALAISE AND FATIGUE: ICD-10-CM

## 2018-05-31 PROCEDURE — 3008F BODY MASS INDEX DOCD: CPT | Performed by: FAMILY MEDICINE

## 2018-05-31 PROCEDURE — 99213 OFFICE O/P EST LOW 20 MIN: CPT | Performed by: FAMILY MEDICINE

## 2018-05-31 PROCEDURE — 1036F TOBACCO NON-USER: CPT | Performed by: FAMILY MEDICINE

## 2018-05-31 RX ORDER — ALLOPURINOL 300 MG/1
300 TABLET ORAL DAILY
Qty: 30 TABLET | Refills: 0 | Status: SHIPPED | OUTPATIENT
Start: 2018-05-31 | End: 2018-08-28 | Stop reason: SDUPTHER

## 2018-05-31 NOTE — PROGRESS NOTES
Subjective:      Patient ID: Dino Awad is a 47 y o  male  HPI   Patient presents for follow-up chronic gout  Reports good medication compliance and tolerance to allopurinol daily  Patient has been using this for almost 1 year as maintenance therapy  In that time, he denies any gout flares  Patient is presently happy with this course of treatment  No uric acid level has been drawn in the last year--lab orders have been given to patient  Gout flares have historically attacked patient's toes and feet bilaterally, however flare in July of 2017 affected the right wrist  Patient is not under the care of a rheumatologist  Needs allopurinol refill today      The following portions of the patient's history were reviewed and updated as appropriate: allergies, current medications, past family history, past medical history, past social history, past surgical history and problem list       Current Outpatient Prescriptions:     allopurinol (ZYLOPRIM) 300 mg tablet, Take 1 tablet (300 mg total) by mouth daily, Disp: 90 tablet, Rfl: 0    multivitamins-fortified A-D-K (SOURCECF) solution, Take 0 5 mL by mouth daily  , Disp: , Rfl:       Review of Systems   Constitutional: Positive for fatigue (Intermittent)  Negative for activity change and fever  HENT: Negative for congestion, ear pain, sinus pain and sore throat  Eyes: Negative for pain, itching and visual disturbance  Respiratory: Negative for cough and shortness of breath  Cardiovascular: Negative for chest pain, palpitations and leg swelling  Gastrointestinal: Negative for abdominal pain, constipation, diarrhea, nausea and vomiting  Endocrine: Negative for cold intolerance and heat intolerance  Genitourinary: Negative for dysuria  Musculoskeletal: Negative for myalgias  Skin: Negative for color change and rash  Neurological: Negative for dizziness, syncope and headaches  Hematological: Negative for adenopathy     Psychiatric/Behavioral: Negative for dysphoric mood  The patient is not nervous/anxious  Objective:      /74 (BP Location: Left arm, Patient Position: Sitting, Cuff Size: Adult)   Pulse 72   Temp 97 6 °F (36 4 °C) (Tympanic)   Ht 5' 10 5" (1 791 m)   Wt 96 7 kg (213 lb 4 oz)   SpO2 97%   BMI 30 17 kg/m²          Physical Exam   Constitutional: He is oriented to person, place, and time  He appears well-developed and well-nourished  No distress  HENT:   Head: Normocephalic and atraumatic  Mouth/Throat: Oropharynx is clear and moist    Eyes: Conjunctivae and EOM are normal  Pupils are equal, round, and reactive to light  No scleral icterus  Neck: Normal range of motion  Neck supple  Cardiovascular: Normal rate, regular rhythm and normal heart sounds  No murmur heard  Pulmonary/Chest: Effort normal and breath sounds normal  No respiratory distress  Abdominal: Soft  Bowel sounds are normal  He exhibits no distension  There is no tenderness  Musculoskeletal: Normal range of motion  He exhibits no edema  Lymphadenopathy:     He has no cervical adenopathy  Neurological: He is alert and oriented to person, place, and time  Skin: Skin is warm and dry  No rash noted  Psychiatric: He has a normal mood and affect  His behavior is normal            Assessment/Plan:   Diagnoses and all orders for this visit:    Chronic gout without tophus, unspecified cause, unspecified site  Hyperuricemia  -     Uric acid; Future  -     Comprehensive metabolic panel; Future    Mild vitamin D deficiency  -     Vitamin D 25 hydroxy; Future    Screening for diabetes mellitus  -     Comprehensive metabolic panel; Future    Screening for lipid disorders  -     Lipid panel; Future    Malaise and fatigue  -     TSH, 3rd generation with T4 reflex; Future            Healthy appearing 81-year-old male with history of gout presents for follow-up and medication refill    Thirty day supply of allopurinol sent to local pharmacy--refills will be given once patient completes blood work ordered today  Included in today's lab orders are screening tests for cholesterol and diabetes, as well as thyroid to assess intermittent fatigue  RTO 6 months for annual physical exam     The patient indicates understanding of these issues and agrees with the plan        Marek Gonzalez DO

## 2018-08-28 DIAGNOSIS — M1A.9XX0 CHRONIC GOUT WITHOUT TOPHUS, UNSPECIFIED CAUSE, UNSPECIFIED SITE: ICD-10-CM

## 2018-08-28 RX ORDER — ALLOPURINOL 300 MG/1
300 TABLET ORAL DAILY
Qty: 90 TABLET | Refills: 3 | Status: SHIPPED | OUTPATIENT
Start: 2018-08-28 | End: 2018-12-03 | Stop reason: SDUPTHER

## 2018-12-03 ENCOUNTER — OFFICE VISIT (OUTPATIENT)
Dept: FAMILY MEDICINE CLINIC | Facility: OTHER | Age: 53
End: 2018-12-03
Payer: COMMERCIAL

## 2018-12-03 VITALS
DIASTOLIC BLOOD PRESSURE: 82 MMHG | WEIGHT: 223.31 LBS | BODY MASS INDEX: 31.26 KG/M2 | TEMPERATURE: 97.5 F | HEART RATE: 71 BPM | OXYGEN SATURATION: 99 % | HEIGHT: 71 IN | SYSTOLIC BLOOD PRESSURE: 130 MMHG

## 2018-12-03 DIAGNOSIS — Z28.21 REFUSED INFLUENZA VACCINE: ICD-10-CM

## 2018-12-03 DIAGNOSIS — M1A.9XX0 CHRONIC GOUT WITHOUT TOPHUS, UNSPECIFIED CAUSE, UNSPECIFIED SITE: ICD-10-CM

## 2018-12-03 DIAGNOSIS — Z12.5 SCREENING PSA (PROSTATE SPECIFIC ANTIGEN): ICD-10-CM

## 2018-12-03 DIAGNOSIS — Z00.00 WELL ADULT EXAM: Primary | ICD-10-CM

## 2018-12-03 PROCEDURE — 99396 PREV VISIT EST AGE 40-64: CPT | Performed by: FAMILY MEDICINE

## 2018-12-03 RX ORDER — ALLOPURINOL 300 MG/1
300 TABLET ORAL DAILY
Qty: 90 TABLET | Refills: 1 | Status: SHIPPED | OUTPATIENT
Start: 2018-12-03 | End: 2018-12-04 | Stop reason: SDUPTHER

## 2018-12-03 NOTE — PROGRESS NOTES
Subjective:      Patient ID: Milton Interiano is a 47 y o  male  HPI   Pt presents for annual physical/wellness exam  Overall health good  Regular eye/dental exams    +reading glasses  Diet: healthy, diverse -- protein from lean meat/fish  Exercise: 3x per wk, cardio/stregth  Colonoscopy:  2016 Dr Gena Morgan  Last PSA: 2017  Vaccines: Tdap 2011,  Prevnar-13/Pneumovax: n/a, SHINGRIX: due        The following portions of the patient's history were reviewed and updated as appropriate: allergies, current medications, past family history, past medical history, past social history, past surgical history and problem list       Current Outpatient Prescriptions:     allopurinol (ZYLOPRIM) 300 mg tablet, Take 1 tablet (300 mg total) by mouth daily, Disp: 90 tablet, Rfl: 1    multivitamins-fortified A-D-K (SOURCECF) solution, Take 0 5 mL by mouth daily  , Disp: , Rfl:       Review of Systems   Constitutional: Negative for appetite change, fatigue, fever and unexpected weight change  HENT: Negative for congestion, dental problem, ear pain, postnasal drip, sore throat and tinnitus  Eyes: Negative for pain, discharge and visual disturbance  Respiratory: Negative for cough, shortness of breath and wheezing  Cardiovascular: Negative for chest pain, palpitations and leg swelling  Gastrointestinal: Negative for abdominal pain, constipation, diarrhea, nausea and vomiting  Endocrine: Negative for cold intolerance and heat intolerance  Genitourinary: Negative for difficulty urinating, dysuria, flank pain and urgency  Musculoskeletal: Negative for arthralgias, back pain, joint swelling and myalgias  Skin: Negative for rash and wound  Allergic/Immunologic: Negative for immunocompromised state  Neurological: Negative for dizziness, syncope, speech difficulty, weakness and numbness  Hematological: Negative for adenopathy  Does not bruise/bleed easily     Psychiatric/Behavioral: Negative for confusion, dysphoric mood and sleep disturbance  The patient is not nervous/anxious  Objective:      /82 (BP Location: Left arm, Patient Position: Sitting, Cuff Size: Large)   Pulse 71   Temp 97 5 °F (36 4 °C) (Tympanic)   Ht 5' 10 5" (1 791 m)   Wt 101 kg (223 lb 5 oz)   SpO2 99%   BMI 31 59 kg/m²          Physical Exam   Constitutional: He is oriented to person, place, and time  He appears well-developed and well-nourished  No distress  HENT:   Head: Normocephalic and atraumatic  Right Ear: Hearing, tympanic membrane, external ear and ear canal normal    Left Ear: Hearing, tympanic membrane, external ear and ear canal normal    Nose: Nose normal    Mouth/Throat: Uvula is midline, oropharynx is clear and moist and mucous membranes are normal  No oropharyngeal exudate  Eyes: Pupils are equal, round, and reactive to light  Conjunctivae and EOM are normal  No scleral icterus  Neck: Normal range of motion  Neck supple  No thyromegaly present  Cardiovascular: Normal rate, regular rhythm and normal heart sounds  No murmur heard  Pulmonary/Chest: Effort normal and breath sounds normal  No respiratory distress  He has no wheezes  He has no rales  Abdominal: Soft  Bowel sounds are normal  He exhibits no distension and no mass  There is no hepatosplenomegaly  There is no tenderness  There is no CVA tenderness  Genitourinary:   Genitourinary Comments: Deferred    Musculoskeletal: Normal range of motion  He exhibits no edema, tenderness or deformity  Lymphadenopathy:     He has no cervical adenopathy  Neurological: He is alert and oriented to person, place, and time  He has normal reflexes  No cranial nerve deficit  Coordination normal    Skin: Skin is warm and dry  No rash noted  He is not diaphoretic  No erythema  Psychiatric: He has a normal mood and affect  His behavior is normal    Vitals reviewed            Assessment/Plan:   Diagnoses and all orders for this visit:    Well adult exam    Refused influenza vaccine  -     PREFERRED: influenza vaccine, [id number], quadrivalent, recombinant, PF, 0 5 mL, for patients 18 yr+ (FLUBLOK)    Screening PSA (prostate specific antigen)  -     PSA, total and free; Future    Chronic gout without tophus, unspecified cause, unspecified site  -     allopurinol (ZYLOPRIM) 300 mg tablet; Take 1 tablet (300 mg total) by mouth daily          61-year-old male presents for complete physical exam   Reviewed the importance of healthy diet and regular exercise in maintaining overall health  Orders for routine blood work reprinted an provided to patient at today's visit--order for PSA level entered today  Follow-up pending lab results  Allopurinol refill sent to mail order pharmacy at patient's request   Patient declines flu vaccine  Return in about 1 year (around 12/3/2019) for Annual physical     The patient indicates understanding of these issues and agrees with the plan          Saravanan Duckworth DO

## 2018-12-04 DIAGNOSIS — M1A.9XX0 CHRONIC GOUT WITHOUT TOPHUS, UNSPECIFIED CAUSE, UNSPECIFIED SITE: ICD-10-CM

## 2018-12-05 RX ORDER — ALLOPURINOL 300 MG/1
300 TABLET ORAL DAILY
Qty: 90 TABLET | Refills: 1 | Status: SHIPPED | OUTPATIENT
Start: 2018-12-05 | End: 2020-01-02 | Stop reason: SDUPTHER

## 2018-12-24 LAB
25(OH)D3+25(OH)D2 SERPL-MCNC: 41.6 NG/ML (ref 30–100)
ALBUMIN SERPL-MCNC: 4.3 G/DL (ref 3.5–5.5)
ALBUMIN/GLOB SERPL: 1.7 {RATIO} (ref 1.2–2.2)
ALP SERPL-CCNC: 54 IU/L (ref 39–117)
ALT SERPL-CCNC: 44 IU/L (ref 0–44)
AST SERPL-CCNC: 32 IU/L (ref 0–40)
BILIRUB SERPL-MCNC: 0.4 MG/DL (ref 0–1.2)
BUN SERPL-MCNC: 18 MG/DL (ref 6–24)
BUN/CREAT SERPL: 16 (ref 9–20)
CALCIUM SERPL-MCNC: 9.4 MG/DL (ref 8.7–10.2)
CHLORIDE SERPL-SCNC: 102 MMOL/L (ref 96–106)
CHOLEST SERPL-MCNC: 136 MG/DL (ref 100–199)
CO2 SERPL-SCNC: 25 MMOL/L (ref 20–29)
CREAT SERPL-MCNC: 1.14 MG/DL (ref 0.76–1.27)
GLOBULIN SER-MCNC: 2.5 G/DL (ref 1.5–4.5)
GLUCOSE SERPL-MCNC: 83 MG/DL (ref 65–99)
HDLC SERPL-MCNC: 44 MG/DL
LABCORP COMMENT: NORMAL
LDLC SERPL CALC-MCNC: 73 MG/DL (ref 0–99)
POTASSIUM SERPL-SCNC: 4.5 MMOL/L (ref 3.5–5.2)
PROT SERPL-MCNC: 6.8 G/DL (ref 6–8.5)
PSA FREE MFR SERPL: 23.3 %
PSA FREE SERPL-MCNC: 0.07 NG/ML
PSA SERPL-MCNC: 0.3 NG/ML (ref 0–4)
SL AMB EGFR AFRICAN AMERICAN: 85 ML/MIN/1.73
SL AMB EGFR NON AFRICAN AMERICAN: 74 ML/MIN/1.73
SL AMB VLDL CHOLESTEROL CALC: 19 MG/DL (ref 5–40)
SODIUM SERPL-SCNC: 141 MMOL/L (ref 134–144)
TRIGL SERPL-MCNC: 97 MG/DL (ref 0–149)
TSH SERPL DL<=0.005 MIU/L-ACNC: 1.9 UIU/ML (ref 0.45–4.5)
URATE SERPL-MCNC: 6.5 MG/DL (ref 3.7–8.6)

## 2018-12-31 ENCOUNTER — TELEPHONE (OUTPATIENT)
Dept: FAMILY MEDICINE CLINIC | Facility: OTHER | Age: 53
End: 2018-12-31

## 2018-12-31 NOTE — TELEPHONE ENCOUNTER
Pt aware, stated he will  results on Wednesday    ----- Message from Sunny Few, DO sent at 12/31/2018  1:16 PM EST -----  Please inform pt that labs normal/stable    Thanks!

## 2020-01-02 ENCOUNTER — OFFICE VISIT (OUTPATIENT)
Dept: FAMILY MEDICINE CLINIC | Facility: OTHER | Age: 55
End: 2020-01-02
Payer: COMMERCIAL

## 2020-01-02 VITALS
HEIGHT: 71 IN | DIASTOLIC BLOOD PRESSURE: 86 MMHG | BODY MASS INDEX: 31.64 KG/M2 | TEMPERATURE: 97.7 F | WEIGHT: 226 LBS | SYSTOLIC BLOOD PRESSURE: 136 MMHG | OXYGEN SATURATION: 98 % | HEART RATE: 80 BPM

## 2020-01-02 DIAGNOSIS — Z00.00 WELL ADULT EXAM: Primary | ICD-10-CM

## 2020-01-02 DIAGNOSIS — J30.9 ALLERGIC RHINITIS, UNSPECIFIED SEASONALITY, UNSPECIFIED TRIGGER: ICD-10-CM

## 2020-01-02 DIAGNOSIS — Z13.220 SCREENING FOR HYPERLIPIDEMIA: ICD-10-CM

## 2020-01-02 DIAGNOSIS — Z12.11 SCREENING FOR COLON CANCER: ICD-10-CM

## 2020-01-02 DIAGNOSIS — M1A.9XX0 CHRONIC GOUT WITHOUT TOPHUS, UNSPECIFIED CAUSE, UNSPECIFIED SITE: ICD-10-CM

## 2020-01-02 DIAGNOSIS — Z13.1 SCREENING FOR DIABETES MELLITUS: ICD-10-CM

## 2020-01-02 DIAGNOSIS — Z12.5 SCREENING FOR PROSTATE CANCER: ICD-10-CM

## 2020-01-02 DIAGNOSIS — E66.3 OVERWEIGHT: ICD-10-CM

## 2020-01-02 DIAGNOSIS — R03.0 BORDERLINE HYPERTENSION: ICD-10-CM

## 2020-01-02 PROCEDURE — 99396 PREV VISIT EST AGE 40-64: CPT | Performed by: NURSE PRACTITIONER

## 2020-01-02 RX ORDER — ALLOPURINOL 100 MG/1
TABLET ORAL
Qty: 180 TABLET | Refills: 2 | Status: SHIPPED | OUTPATIENT
Start: 2020-01-02 | End: 2020-06-19 | Stop reason: SDUPTHER

## 2020-01-02 NOTE — PATIENT INSTRUCTIONS

## 2020-01-02 NOTE — PROGRESS NOTES
Assessment/Plan:           Problem List Items Addressed This Visit     Gout  --Well controlled on allopurinol + dietary measures  --Avoid alcohol (alissa beer)  --Repeat uric acid    Relevant Medications    allopurinol (ZYLOPRIM) 100 mg tablet: 2 tabs daily    Other Relevant Orders    Uric acid    Overweight  --Weight loss measures discussed, encouraged including healthier diet and re-establishing regular cardio program    --Handout given  Declining referral to  nutrition or weight management at this time  --Check yearly screening labs   --RTO 6 months for recheck  Relevant Orders    Hemoglobin A1C    Lipid Panel with Direct LDL reflex    TSH, 3rd generation with Free T4 reflex    Borderline hypertension  --Hold on anti-hypertensive for now, while working on dietary, weight  loss measures  --Continue home monitoring, calling for persistently or significantly > 130/80  --RTO 6 months for repeat in office    Relevant Orders    CBC and differential    Comprehensive metabolic panel    TSH, 3rd generation with Free T4 reflex    Allergic rhinitis  --Controlled with OTC saline nose spray  Add steroid nasal spray, antihistamine if needed  Other Visit Diagnoses     Well adult exam    -  Primary  --Healthier diet, regular exercise encouraged per above  --Yearly prostate exam by urologist    --UTD with colonoscopy  Cologuard ordered  --UTD with eye exam, Tdap  Declines flu shot  Screening for hyperlipidemia        Relevant Orders    Lipid Panel with Direct LDL reflex    Screening for diabetes mellitus        Relevant Orders    Hemoglobin A1C    UA (URINE) with reflex to Scope    Screening for prostate cancer        Relevant Orders    PSA, Total Screen    Screening for colon cancer        Relevant Orders    Cologuard          RTO 6 months      Subjective:      Patient ID: Brielle Garay is a 49 y o  male  Patient of Dr Shayy Santana  New to myself    Here for routine well exam      No acute complaints or issues  Remains on allopurinol (200 mg once a day) which seems to be working well for him  No gout attacks x 2 years  Avoids beer, other dietary triggers    One 6year old daughter  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  Works flipping houses  Dieter at times  Uses saline nasal spray to help prevent sinus congestion  No asthma or breathing issues  Non-smoker  Diet is fair  Wide variety, more healthy at home  Not as healthy when on the road (subs, fast food)  No added salt  No regular exercise regimen at present  Previously did mix of cardio and weights  Hopes to restart soon  Drinks water  A couple glasses of red wine on the weekend  No other alcohol  No drug use  Home BP readings 130's-140's/80's when he checks  Not usually resting readings, however  No prior antihypertensive use  Bilateral knee surgeries a couple years ago  No problems since  No sleep issues including apnea, un refreshed in the morning  No depression, other mood issues  Sees urologist yearly (Dr Jannet Hernandez)  No urinary issues lately including decreased flow, nocturia, incontinence, hematuria  Colonoscopy 2016  Normal   Repeat in 5-10 years (he believes)  No recent stool changes including melena  Eye exam within the past year  Tdap 2011          The following portions of the patient's history were reviewed and updated as appropriate: current medications, past family history, past medical history, past social history, past surgical history and problem list     Review of Systems   Constitutional: Negative for fatigue and fever  HENT: Negative for sore throat  Eyes: Negative for visual disturbance  Respiratory: Negative for cough and shortness of breath  Cardiovascular: Negative for chest pain and palpitations  Gastrointestinal: Negative for abdominal pain, constipation, diarrhea and vomiting  Genitourinary: Negative for difficulty urinating and dysuria     Musculoskeletal: Negative for arthralgias and gait problem  Skin: Negative for rash  Neurological: Negative for dizziness and headaches  Psychiatric/Behavioral: Negative for dysphoric mood  Objective:      /86 (BP Location: Left arm, Patient Position: Sitting, Cuff Size: Adult)   Pulse 80   Temp 97 7 °F (36 5 °C) (Tympanic)   Ht 5' 10 5" (1 791 m)   Wt 103 kg (226 lb)   SpO2 98%   BMI 31 97 kg/m²          Physical Exam   Constitutional: He is oriented to person, place, and time  He appears well-developed and well-nourished  HENT:   Head: Normocephalic and atraumatic  Right Ear: External ear normal    Left Ear: External ear normal    Nose: Nose normal    Mouth/Throat: Oropharynx is clear and moist    Eyes: Pupils are equal, round, and reactive to light  Conjunctivae are normal    Neck: Normal range of motion  Neck supple  Cardiovascular: Normal rate, regular rhythm, normal heart sounds and intact distal pulses  Pulmonary/Chest: Effort normal and breath sounds normal    Abdominal: Soft  Bowel sounds are normal  There is no tenderness  Genitourinary:   Genitourinary Comments: Deferred--sees urologist   Lymphadenopathy:     He has no cervical adenopathy  Neurological: He is alert and oriented to person, place, and time  He has normal reflexes  Skin: Skin is warm and dry  Psychiatric: He has a normal mood and affect

## 2020-06-18 LAB
ALBUMIN SERPL-MCNC: 4.6 G/DL (ref 3.8–4.9)
ALBUMIN/GLOB SERPL: 1.8 {RATIO} (ref 1.2–2.2)
ALP SERPL-CCNC: 49 IU/L (ref 39–117)
ALT SERPL-CCNC: 36 IU/L (ref 0–44)
APPEARANCE UR: CLEAR
AST SERPL-CCNC: 34 IU/L (ref 0–40)
BASOPHILS # BLD AUTO: 0 X10E3/UL (ref 0–0.2)
BASOPHILS NFR BLD AUTO: 0 %
BILIRUB SERPL-MCNC: 0.3 MG/DL (ref 0–1.2)
BILIRUB UR QL STRIP: NEGATIVE
BUN SERPL-MCNC: 21 MG/DL (ref 6–24)
BUN/CREAT SERPL: 18 (ref 9–20)
CALCIUM SERPL-MCNC: 9.2 MG/DL (ref 8.7–10.2)
CHLORIDE SERPL-SCNC: 100 MMOL/L (ref 96–106)
CHOLEST SERPL-MCNC: 179 MG/DL (ref 100–199)
CO2 SERPL-SCNC: 16 MMOL/L (ref 20–29)
COLOR UR: YELLOW
CREAT SERPL-MCNC: 1.15 MG/DL (ref 0.76–1.27)
EOSINOPHIL # BLD AUTO: 0.1 X10E3/UL (ref 0–0.4)
EOSINOPHIL NFR BLD AUTO: 2 %
ERYTHROCYTE [DISTWIDTH] IN BLOOD BY AUTOMATED COUNT: 12.5 % (ref 11.6–15.4)
EST. AVERAGE GLUCOSE BLD GHB EST-MCNC: 123 MG/DL
GLOBULIN SER-MCNC: 2.6 G/DL (ref 1.5–4.5)
GLUCOSE SERPL-MCNC: 129 MG/DL (ref 65–99)
GLUCOSE UR QL: NEGATIVE
HBA1C MFR BLD: 5.9 % (ref 4.8–5.6)
HCT VFR BLD AUTO: 45.6 % (ref 37.5–51)
HDLC SERPL-MCNC: 50 MG/DL
HGB BLD-MCNC: 15 G/DL (ref 13–17.7)
HGB UR QL STRIP: NEGATIVE
IMM GRANULOCYTES # BLD: 0 X10E3/UL (ref 0–0.1)
IMM GRANULOCYTES NFR BLD: 0 %
KETONES UR QL STRIP: NEGATIVE
LDLC SERPL CALC-MCNC: 92 MG/DL (ref 0–99)
LDLC/HDLC SERPL: 1.8 RATIO (ref 0–3.6)
LEUKOCYTE ESTERASE UR QL STRIP: NEGATIVE
LYMPHOCYTES # BLD AUTO: 2.4 X10E3/UL (ref 0.7–3.1)
LYMPHOCYTES NFR BLD AUTO: 47 %
MCH RBC QN AUTO: 30.2 PG (ref 26.6–33)
MCHC RBC AUTO-ENTMCNC: 32.9 G/DL (ref 31.5–35.7)
MCV RBC AUTO: 92 FL (ref 79–97)
MICRO URNS: NORMAL
MONOCYTES # BLD AUTO: 0.4 X10E3/UL (ref 0.1–0.9)
MONOCYTES NFR BLD AUTO: 8 %
NEUTROPHILS # BLD AUTO: 2.3 X10E3/UL (ref 1.4–7)
NEUTROPHILS NFR BLD AUTO: 43 %
NITRITE UR QL STRIP: NEGATIVE
PH UR STRIP: 7 [PH] (ref 5–7.5)
PLATELET # BLD AUTO: 167 X10E3/UL (ref 150–450)
POTASSIUM SERPL-SCNC: 4.4 MMOL/L (ref 3.5–5.2)
PROT SERPL-MCNC: 7.2 G/DL (ref 6–8.5)
PROT UR QL STRIP: NEGATIVE
RBC # BLD AUTO: 4.97 X10E6/UL (ref 4.14–5.8)
SL AMB EGFR AFRICAN AMERICAN: 83 ML/MIN/1.73
SL AMB EGFR NON AFRICAN AMERICAN: 72 ML/MIN/1.73
SL AMB VLDL CHOLESTEROL CALC: 37 MG/DL (ref 5–40)
SODIUM SERPL-SCNC: 140 MMOL/L (ref 134–144)
SP GR UR: 1.02 (ref 1–1.03)
TRIGL SERPL-MCNC: 185 MG/DL (ref 0–149)
TSH SERPL DL<=0.005 MIU/L-ACNC: 1.93 UIU/ML (ref 0.45–4.5)
URATE SERPL-MCNC: 6.4 MG/DL (ref 3.7–8.6)
UROBILINOGEN UR STRIP-ACNC: 0.2 MG/DL (ref 0.2–1)
WBC # BLD AUTO: 5.2 X10E3/UL (ref 3.4–10.8)

## 2020-06-19 ENCOUNTER — OFFICE VISIT (OUTPATIENT)
Dept: FAMILY MEDICINE CLINIC | Facility: OTHER | Age: 55
End: 2020-06-19
Payer: COMMERCIAL

## 2020-06-19 VITALS
TEMPERATURE: 97.7 F | HEART RATE: 66 BPM | DIASTOLIC BLOOD PRESSURE: 80 MMHG | HEIGHT: 71 IN | OXYGEN SATURATION: 96 % | BODY MASS INDEX: 31.8 KG/M2 | SYSTOLIC BLOOD PRESSURE: 126 MMHG | WEIGHT: 227.13 LBS

## 2020-06-19 DIAGNOSIS — R73.03 PREDIABETES: ICD-10-CM

## 2020-06-19 DIAGNOSIS — M54.2 TENDERNESS OF NECK: ICD-10-CM

## 2020-06-19 DIAGNOSIS — E66.3 OVERWEIGHT: ICD-10-CM

## 2020-06-19 DIAGNOSIS — E78.1 HYPERTRIGLYCERIDEMIA: ICD-10-CM

## 2020-06-19 DIAGNOSIS — M1A.9XX0 CHRONIC GOUT WITHOUT TOPHUS, UNSPECIFIED CAUSE, UNSPECIFIED SITE: ICD-10-CM

## 2020-06-19 DIAGNOSIS — R03.0 BORDERLINE HYPERTENSION: Primary | ICD-10-CM

## 2020-06-19 PROCEDURE — 99214 OFFICE O/P EST MOD 30 MIN: CPT | Performed by: NURSE PRACTITIONER

## 2020-06-19 PROCEDURE — 1036F TOBACCO NON-USER: CPT | Performed by: NURSE PRACTITIONER

## 2020-06-19 PROCEDURE — 3008F BODY MASS INDEX DOCD: CPT | Performed by: NURSE PRACTITIONER

## 2020-06-19 RX ORDER — ALLOPURINOL 100 MG/1
TABLET ORAL
Qty: 180 TABLET | Refills: 2 | Status: SHIPPED | OUTPATIENT
Start: 2020-06-19 | End: 2021-01-06 | Stop reason: SDUPTHER

## 2020-12-09 ENCOUNTER — HOSPITAL ENCOUNTER (OUTPATIENT)
Dept: ULTRASOUND IMAGING | Facility: HOSPITAL | Age: 55
Discharge: HOME | End: 2020-12-09
Attending: NURSE PRACTITIONER
Payer: COMMERCIAL

## 2020-12-09 DIAGNOSIS — M54.2 TENDERNESS OF NECK: ICD-10-CM

## 2020-12-09 PROCEDURE — 76536 US EXAM OF HEAD AND NECK: CPT

## 2021-01-06 ENCOUNTER — OFFICE VISIT (OUTPATIENT)
Dept: FAMILY MEDICINE CLINIC | Facility: OTHER | Age: 56
End: 2021-01-06
Payer: COMMERCIAL

## 2021-01-06 VITALS
BODY MASS INDEX: 32.21 KG/M2 | SYSTOLIC BLOOD PRESSURE: 140 MMHG | TEMPERATURE: 98.2 F | WEIGHT: 225 LBS | HEART RATE: 70 BPM | OXYGEN SATURATION: 97 % | HEIGHT: 70 IN | RESPIRATION RATE: 18 BRPM | DIASTOLIC BLOOD PRESSURE: 90 MMHG

## 2021-01-06 DIAGNOSIS — E79.0 HYPERURICEMIA: ICD-10-CM

## 2021-01-06 DIAGNOSIS — R73.03 PREDIABETES: ICD-10-CM

## 2021-01-06 DIAGNOSIS — Z00.00 ANNUAL PHYSICAL EXAM: Primary | ICD-10-CM

## 2021-01-06 DIAGNOSIS — E78.1 HYPERTRIGLYCERIDEMIA: ICD-10-CM

## 2021-01-06 DIAGNOSIS — Z11.59 ENCOUNTER FOR HCV SCREENING TEST FOR LOW RISK PATIENT: ICD-10-CM

## 2021-01-06 DIAGNOSIS — Z11.4 SCREENING FOR HIV (HUMAN IMMUNODEFICIENCY VIRUS): ICD-10-CM

## 2021-01-06 DIAGNOSIS — M1A.9XX0 CHRONIC GOUT WITHOUT TOPHUS, UNSPECIFIED CAUSE, UNSPECIFIED SITE: ICD-10-CM

## 2021-01-06 PROCEDURE — 99396 PREV VISIT EST AGE 40-64: CPT | Performed by: FAMILY MEDICINE

## 2021-01-06 PROCEDURE — 3008F BODY MASS INDEX DOCD: CPT | Performed by: FAMILY MEDICINE

## 2021-01-06 PROCEDURE — 1036F TOBACCO NON-USER: CPT | Performed by: FAMILY MEDICINE

## 2021-01-06 PROCEDURE — 3725F SCREEN DEPRESSION PERFORMED: CPT | Performed by: FAMILY MEDICINE

## 2021-01-06 RX ORDER — ALLOPURINOL 100 MG/1
TABLET ORAL
Qty: 180 TABLET | Refills: 2 | Status: SHIPPED | OUTPATIENT
Start: 2021-01-06 | End: 2021-01-08 | Stop reason: SDUPTHER

## 2021-01-06 NOTE — PATIENT INSTRUCTIONS
DASH Eating Plan   AMBULATORY CARE:   The DASH (Dietary Approaches to Stop Hypertension) Eating Plan  is designed to help prevent or lower high blood pressure  It can also help to lower LDL (bad) cholesterol and decrease your risk for heart disease  The plan is low in sodium, sugar, unhealthy fats, and total fat  It is high in potassium, calcium, magnesium, and fiber  These nutrients are added when you eat more fruits, vegetables, and whole grains  Your sodium limit each day: Your dietitian will tell you how much sodium is safe for you to have each day  People with high blood pressure should have no more than 1,500 to 2,300 mg of sodium in a day  A teaspoon (tsp) of salt has 2,300 mg of sodium  This may seem like a difficult goal, but small changes to the foods you eat can make a big difference  Your healthcare provider or dietitian can help you create a meal plan that follows your sodium limit  How to limit sodium:   · Read food labels  Food labels can help you choose foods that are low in sodium  The amount of sodium is listed in milligrams (mg)  The % Daily Value (DV) column tells you how much of your daily needs are met by 1 serving of the food for each nutrient listed  Choose foods that have less than 5% of the DV of sodium  These foods are considered low in sodium  Foods that have 20% or more of the DV of sodium are considered high in sodium  Avoid foods that have more than 300 mg of sodium in each serving  Choose foods that say low-sodium, reduced-sodium, or no salt added on the food label  · Avoid salt  Do not salt food at the table, and add very little salt to foods during cooking  Use herbs and spices, such as onions, garlic, and salt-free seasonings to add flavor to foods  Try lemon or lime juice or vinegar to give foods a tart flavor  Use hot peppers or a small amount of hot pepper sauce to add a spicy flavor to foods  · Ask about salt substitutes    Ask your healthcare provider if you may use salt substitutes  Some salt substitutes have ingredients that can be harmful if you have certain health conditions  · Choose foods carefully at restaurants  Meals from restaurants, especially fast food restaurants, are often high in sodium  Some restaurants have nutrition information that tells you the amount of sodium in their foods  Ask to have your food prepared with less, or no salt  What you need to know about fats:   · Include healthy fats  Examples are unsaturated fats and omega-3 fatty acids  Unsaturated fats are found in soybean, canola, olive, or sunflower oil, and liquid and soft tub margarines  Omega-3 fatty acids are found in fatty fish, such as salmon, tuna, mackerel, and sardines  It is also found in flaxseed oil and ground flaxseed  · Avoid unhealthy fats  Do not eat unhealthy fats, such as saturated fats and trans fats  Saturated fats are found in foods that contain fat from animals  Examples are fatty meats, whole milk, butter, cream, and other dairy foods  It is also found in shortening, stick margarine, palm oil, and coconut oil  Trans fats are found in fried foods, crackers, chips, and baked goods made with margarine or shortening  Foods to include: With the DASH eating plan, you need to eat a certain number of servings from each food group  This will help you get enough of certain nutrients and limit others  The amount of servings you should eat depends on how many calories you need  Your dietitian can tell you how many calories you need  The number of servings listed next to the food groups below are for people who need about 2,000 calories each day  · Grains:  6 to 8 servings (3 of these servings should be whole-grain foods)    ? 1 slice of whole-grain bread     ? 1 ounce of dry cereal    ? ½ cup of cooked cereal, pasta, or brown rice    · Vegetables and fruits:  4 to 5 servings of fruits and 4 to 5 servings of vegetables    ? 1 medium fruit    ?  ½ cup of frozen, canned (no added salt), or chopped fresh vegetables     ? ½ cup of fresh, frozen, dried, or canned fruit (canned in light syrup or fruit juice)    ? ½ cup of vegetable or fruit juice    · Dairy:  2 to 3 servings    ? 1 cup of nonfat (skim) or 1% milk    ? 1½ ounces of fat-free or low-fat cheese    ? 6 ounces of nonfat or low-fat yogurt    · Lean meat, poultry, and fish:  6 ounces or less    ? Poultry (chicken, turkey) with no skin    ? Fish (especially fatty fish, such as salmon, fresh tuna, or mackerel)    ? Lean beef and pork (loin, round, extra lean hamburger)    ? Egg whites and egg substitutes    · Nuts, seeds, and legumes:  4 to 5 servings each week    ? ½ cup of cooked beans and peas    ? 1½ ounces of unsalted nuts    ? 2 tablespoons of peanut butter or seeds    · Sweets and added sugars:  5 or less each week    ? 1 tablespoon of sugar, jelly, or jam    ? ½ cup of sorbet or gelatin    ? 1 cup of lemonade    · Fats:  2 to 3 servings each week    ? 1 teaspoon of soft margarine or vegetable oil    ? 1 tablespoon of mayonnaise    ? 2 tablespoons of salad dressing    Foods to avoid:   · Grains:      ? Baked goods, such as doughnuts, pastries, cookies, and biscuits (high in fat and sugar)    ? Mixes for cornbread and biscuits, packaged foods, such as bread stuffing, rice and pasta mixes, macaroni and cheese, and instant cereals (high in sodium)    · Fruits and vegetables:      ? Regular, canned vegetables (high in sodium)    ? Sauerkraut, pickled vegetables, and other foods prepared in brine (high in sodium)    ? Fried vegetables or vegetables in butter or high-fat sauces    ? Fruit in cream or butter sauce (high in fat)    · Dairy:      ? Whole milk, 2% milk, and cream (high in fat)    ? Regular cheese and processed cheese (high in fat and sodium)    · Meats and protein foods:      ? Smoked or cured meat, such as corned beef, grayson, ham, hot dogs, and sausage (high in fat and sodium)    ?  Canned beans and canned meats or spreads, such as potted meats, sardines, anchovies, and imitation seafood (high in sodium)    ? Deli or lunch meats, such as bologna, ham, turkey, and roast beef (high in sodium)    ? High-fat meat (T-bone steak, regular hamburger, and ribs)    ? Whole eggs and egg yolks (high in fat)    · Other:      ? Seasonings made with salt, such as garlic salt, celery salt, onion salt, seasoned salt, meat tenderizers, and monosodium glutamate (MSG)    ? Miso soup and canned or dried soup mixes (high in sodium)    ? Regular soy sauce, barbecue sauce, teriyaki sauce, steak sauce, Worcestershire sauce, and most flavored vinegars (high in sodium)    ? Regular condiments, such as mustard, ketchup, and salad dressings (high in sodium)    ? Gravy and sauces, such as Ulisses or cheese sauces (high in sodium and fat)    ? Drinks high in sugar, such as soda or fruit drinks    ? Snack foods, such as salted chips, popcorn, pretzels, pork rinds, salted crackers, and salted nuts    ? Frozen foods, such as dinners, entrees, vegetables with sauces, and breaded meats (high in sodium)    Other guidelines to follow:   · Maintain a healthy weight  Your risk for heart disease is higher if you are overweight  Your healthcare provider may suggest that you lose weight if you are overweight  You can lose weight by eating fewer calories and foods that have added sugars and fat  The DASH meal plan can help you do this  Decrease calories by eating smaller portions at each meal and fewer snacks  Ask your healthcare provider for more information about how to lose weight  · Exercise regularly  Regular exercise can help you reach or maintain a healthy weight  Regular exercise can also help decrease your blood pressure and improve your cholesterol levels  Get 30 minutes or more of moderate exercise each day of the week  To lose weight, get at least 60 minutes of exercise   Talk to your healthcare provider about the best exercise program for you  · Limit alcohol  Women should limit alcohol to 1 drink a day  Men should limit alcohol to 2 drinks a day  A drink of alcohol is 12 ounces of beer, 5 ounces of wine, or 1½ ounces of liquor  © Copyright [address] Information is for End User's use only and may not be sold, redistributed or otherwise used for commercial purposes  All illustrations and images included in CareNotes® are the copyrighted property of [employer]  or [address] [place], [place] [address]gautam   The above information is an  only  It is not intended as medical advice for individual conditions or treatments  Talk to your doctor, nurse or pharmacist before following any medical regimen to see if it is safe and effective for you

## 2021-01-06 NOTE — PROGRESS NOTES
ADULT ANNUAL [address]  [facility]    NAME: Aurora Yañez  AGE: 48 y o  SEX: male  : 1965     DATE: 2021     Assessment and Plan:     Problem List Items Addressed This Visit        Other    Gout    Relevant Medications    allopurinol (ZYLOPRIM) 100 mg tablet    Other Relevant Orders    Uric acid    Hyperuricemia    Relevant Medications    allopurinol (ZYLOPRIM) 100 mg tablet    Other Relevant Orders    Uric acid    Prediabetes    Relevant Orders    HEMOGLOBIN A1C W/ EAG ESTIMATION    Comprehensive metabolic panel    Hypertriglyceridemia    Relevant Orders    Lipid Panel with Direct LDL reflex      Other Visit Diagnoses     Annual physical exam    -  Primary    Screening for HIV (human immunodeficiency virus)        Relevant Orders    HIV 1/2 ANTIGEN/ANTIBODY (4TH GENERATION) W REFLEX [facility]    Encounter for HCV screening test for low risk patient        Relevant Orders    Hepatitis C antibody          Immunizations and preventive care screenings were discussed with patient today  Appropriate education was printed on patient's after visit summary  Counseling:  Alcohol/drug use: discussed moderation in alcohol intake, the recommendations for healthy alcohol use, and avoidance of illicit drug use  Dental Health: discussed importance of regular tooth brushing, flossing, and dental visits  Injury prevention: discussed safety/seat belts, safety helmets, smoke detectors, carbon dioxide detectors, and smoking near bedding or upholstery  Sexual health: discussed sexually transmitted diseases, partner selection, use of condoms, avoidance of unintended pregnancy, and contraceptive alternatives  · Exercise: the importance of regular exercise/physical activity was discussed  Recommend exercise 3-5 times per week for at least 30 minutes  BMI Counseling: Body mass index is 32 28 kg/m²   The BMI is above normal  Nutrition recommendations include encouraging healthy choices of fruits and vegetables  Exercise recommendations include moderate physical activity 150 minutes/week and exercising 3-5 times per week  No pharmacotherapy was ordered  Return in about 2 months (around 3/6/2021) for Recheck BP  Chief Complaint:     Chief Complaint   Patient presents with    Follow-up     labs and procedures       History of Present Illness:     Adult Annual Physical   Patient here for a comprehensive physical exam  The patient reports problems - chest soreness  He states that he is in generally good health and some neck pain he had at a previous visit has resolved  He does note that his BP has been around 140s/90s at home  He also reports occasional upper L chest pain since 1 5 days ago 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)  He says he has no pain currently but it sometimes comes on with L arm movement  He denies sweating, N/V, radiation of the pain, and pain with physical exertion not related to the arm or chest  He endorses occasional palpitations particularly after eating but says this is not new, and he reports about 15 lb weight gain over the last 3 years  Diet and Physical Activity  · Diet/Nutrition: limited junk food and limited fruits/vegetables  · Exercise: walking and very active at work  Depression Screening  PHQ-9 Depression Screening    PHQ-9:   Frequency of the following problems over the past two weeks:      Little interest or pleasure in doing things: 0 - not at all  Feeling down, depressed, or hopeless: 0 - not at all  PHQ-2 Score: 0       General Health  · Sleep: sleeps well, gets more than 8 hours of sleep on average and sleeps in 2 4-hour periods  · Hearing: normal - bilateral   · Vision: no vision problems, goes for regular eye exams, most recent eye exam <1 year ago and reading glasses  · Dental: regular dental visits and brushes teeth once daily          Health  · Symptoms include: none     Review of Systems:     Review of Systems   Constitutional: Negative for chills, fever and unexpected weight change  HENT: Negative for congestion, rhinorrhea and sore throat  Eyes: Negative for visual disturbance  Respiratory: Negative for cough and shortness of breath  Cardiovascular: Positive for chest pain (musculoskeletal) and palpitations  Negative for leg swelling  Gastrointestinal: Negative for abdominal pain, blood in stool, constipation, diarrhea, nausea and vomiting  Genitourinary: Negative for dysuria and hematuria  Musculoskeletal: Negative for arthralgias and myalgias  Skin: Negative for rash  Neurological: Negative for dizziness, light-headedness and headaches  Psychiatric/Behavioral: Negative for dysphoric mood  All other systems reviewed and are negative  Past Medical History:     Past Medical History:   Diagnosis Date    Gout     Hypertension     Male erectile dysfunction     [id number] RESOLVED    Wrist fracture     LEFT      Past Surgical History:     Past Surgical History:   Procedure Laterality Date    COLONOSCOPY N/A 6/10/2016    Procedure: COLONOSCOPY;  Surgeon: Lyubov Burleson MD;  Location: [place] [address] GI LAB;   Service:     KNEE ARTHROSCOPY Left     WITH MEDIAL MENISCETOMY    NE KNEE SCOPE,MED/LAT MENISECTOMY Right 8/18/2016    Procedure: KNEE ARTHROSCOPIC PARTIAL MEDIAL MENISCECTOMY ;  Surgeon: Oriana Gardner MD;  [facility];  Service: Orthopedics    VASECTOMY      WITH REVERSAL      Family History:     Family History   Problem Relation Age of Onset    Coronary artery disease Mother     Gout Mother     Hypertension Mother     Uterine cancer Mother     Gout Father     Hypertension Father     Benign prostatic hyperplasia Father     Gout Brother     Diabetes Daughter     Gout Daughter     Coronary artery disease Maternal Grandmother     Hypertension Maternal Grandmother       Social History:        Social History     Socioeconomic History    Marital status: /Civil Union     Spouse name: None    Number of children: None    Years of education: SOME COLLEGE    Highest education level: None   Occupational History    Occupation: NETWORK TE[employer]   Social Needs    Financial resource strain: None    Food insecurity     Worry: None     Inability: None    Transportation needs     Medical: None     Non-medical: None   Tobacco Use    Smoking status: Never Smoker    Smokeless tobacco: Never Used   Substance and Sexual Activity    Alcohol use: Yes     Alcohol/week: 1 0 standard drinks     Types: 1 Glasses of wine per week     Comment: 0-2 drinks per wk     Drug use: No    Sexual activity: Yes     Partners: Female     Birth control/protection: Surgical   Lifestyle    Physical activity     Days per week: None     Minutes per session: None    Stress: None   Relationships    Social connections     Talks on phone: None     Gets together: None     Attends Temple service: None     Active member of club or organization: None     Attends meetings of clubs or organizations: None     Relationship status: None    Intimate partner violence     Fear of current or ex partner: None     Emotionally abused: None     Physically abused: None     Forced sexual activity: None   Other Topics Concern    None   Social History Narrative    None      Current Medications:     Current Outpatient Medications   Medication Sig Dispense Refill    allopurinol (ZYLOPRIM) 100 mg tablet Take 2 tablets daily 180 tablet 2    multivitamins-fortified A-D-K (SOURCECF) solution Take 0 5 mL by mouth daily  No current facility-administered medications for this visit  Allergies:     No Known Allergies   Physical Exam:     /90 (BP Location: Right arm, Patient Position: Sitting, Cuff Size: Standard)   Pulse 70   Temp 98 2 °F (36 8 °C) (Temporal)   Resp 18   Ht 5' 10" (1 778 m)   Wt 102 kg (225 lb)   SpO2 97%   BMI 32 28 kg/m²     Physical Exam  Vitals signs reviewed  Constitutional:       General: He is not in acute distress  HENT:      Head: Normocephalic and atraumatic  Mouth/Throat:      Mouth: Mucous membranes are moist       Pharynx: Oropharynx is clear  Eyes:      Extraocular Movements: Extraocular movements intact  Conjunctiva/sclera: Conjunctivae normal       Pupils: Pupils are equal, round, and reactive to light  Neck:      Musculoskeletal: Normal range of motion and neck supple  Cardiovascular:      Rate and Rhythm: Normal rate and regular rhythm  Pulses: Normal pulses  Heart sounds: Normal heart sounds  No murmur  No friction rub  No gallop  Pulmonary:      Effort: Pulmonary effort is normal  No respiratory distress  Breath sounds: Normal breath sounds  No wheezing, rhonchi or rales  Chest:      Chest wall: No tenderness  Abdominal:      General: Bowel sounds are normal  There is no distension  Palpations: Abdomen is soft  Tenderness: There is no abdominal tenderness  There is no right CVA tenderness, left CVA tenderness or guarding  Musculoskeletal: Normal range of motion  Right lower leg: No edema  Left lower leg: No edema  Lymphadenopathy:      Cervical: No cervical adenopathy  Skin:     General: Skin is warm and dry  Neurological:      General: No focal deficit present  Mental Status: He is alert and oriented to person, place, and time     Psychiatric:         Mood and Affect: Mood normal          Behavior: Behavior normal           Alyse Andre MD  [facility]

## 2021-01-08 DIAGNOSIS — M1A.9XX0 CHRONIC GOUT WITHOUT TOPHUS, UNSPECIFIED CAUSE, UNSPECIFIED SITE: ICD-10-CM

## 2021-01-08 DIAGNOSIS — E79.0 HYPERURICEMIA: ICD-10-CM

## 2021-01-08 RX ORDER — ALLOPURINOL 100 MG/1
TABLET ORAL
Qty: 180 TABLET | Refills: 2 | Status: SHIPPED | OUTPATIENT
Start: 2021-01-08 | End: 2021-07-06 | Stop reason: SDUPTHER

## 2021-03-08 ENCOUNTER — OFFICE VISIT (OUTPATIENT)
Dept: CARDIOLOGY CLINIC | Facility: CLINIC | Age: 56
End: 2021-03-08
Payer: COMMERCIAL

## 2021-03-08 VITALS
HEIGHT: 70 IN | DIASTOLIC BLOOD PRESSURE: 84 MMHG | SYSTOLIC BLOOD PRESSURE: 132 MMHG | WEIGHT: 226 LBS | BODY MASS INDEX: 32.35 KG/M2 | HEART RATE: 60 BPM

## 2021-03-08 DIAGNOSIS — R07.9 CHEST PAIN, UNSPECIFIED TYPE: Primary | ICD-10-CM

## 2021-03-08 DIAGNOSIS — R03.0 BORDERLINE HYPERTENSION: ICD-10-CM

## 2021-03-08 PROCEDURE — 93000 ELECTROCARDIOGRAM COMPLETE: CPT | Performed by: INTERNAL MEDICINE

## 2021-03-08 PROCEDURE — 3008F BODY MASS INDEX DOCD: CPT | Performed by: INTERNAL MEDICINE

## 2021-03-08 PROCEDURE — 99244 OFF/OP CNSLTJ NEW/EST MOD 40: CPT | Performed by: INTERNAL MEDICINE

## 2021-03-08 NOTE — PROGRESS NOTES
Cardiology Consultation     Glen Crystal  [id number]  1965  [address] [facility] [place] [address] [facility] [address]      1  Chest pain, unspecified type  POCT ECG    Echo stress test w contrast if indicated   2  Borderline hypertension  Echo stress test w contrast if indicated       Discussion/Summary:    Left-sided chest pain which radiates down his arm  He has borderline high blood pressure  Strong family history of heart disease with her early-onset coronary disease in both his mother and his father  I recommended a stress echo  He will continue to monitor his blood pressure at home  Keep to a low-sodium diet and increase his aerobic activity  Repeat blood work was ordered by his primary care physician  Particular monitoring of his lipid panel  Also follow-up of his serum bicarbonate which was low when previously checked  History of Present Illness:  64-year-old man  He has a family history of coronary artery disease at an early age in both his mother and his father  He has borderline high blood pressure  Has never been on medical therapy for this, but monitors occasionally at home  He comes to the office today for evaluation of chest pain  Reports the symptoms have been on going for 9-12 months  They are intermittent  Mostly left side of his chest and radiating down his left arm  He will occasionally feel fluttering and palpitations, but not always associated with this  He used to participate in a vigorous exercise regimen, but for the last several years says he gets most of his exercise activity 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  No PND, orthopnea, shortness of breath  He said he did a health screen which included various testing and also included an ultrasound which showed an abnormality on his liver which is further being evaluated    Blood work has been done previously  I reviewed this in the system  Lipid panel is acceptable slightly elevated triglycerides but unclear if this was fasting given the time of day  His serum bicarbonate was low  Patient Active Problem List   Diagnosis    Tear of medial meniscus of right knee    Allergic rhinitis    Gout    Reduced libido    Hyperuricemia    Testosterone deficiency    Overweight    Borderline hypertension    Prediabetes    Hypertriglyceridemia    Tenderness of neck     Past Medical History:   Diagnosis Date    Gout     Hypertension     Male erectile dysfunction     [id number] RESOLVED    Wrist fracture     LEFT     Social History     Tobacco Use    Smoking status: Never Smoker    Smokeless tobacco: Never Used   Substance Use Topics    Alcohol use: Yes     Alcohol/week: 1 0 standard drinks     Types: 1 Glasses of wine per week     Comment: 0-2 drinks per wk     Drug use: No      Family History   Problem Relation Age of Onset    Coronary artery disease Mother     Gout Mother     Hypertension Mother     Uterine cancer Mother     Gout Father     Hypertension Father     Benign prostatic hyperplasia Father     Gout Brother     Diabetes Daughter     Gout Daughter     Coronary artery disease Maternal Grandmother     Hypertension Maternal Grandmother      Past Surgical History:   Procedure Laterality Date    COLONOSCOPY N/A 6/10/2016    Procedure: COLONOSCOPY;  Surgeon: Thomas Harris MD;  Location: [place] [address] GI LAB;   Service:     KNEE ARTHROSCOPY Left     WITH MEDIAL MENISCETOMY    AL KNEE SCOPE,MED/LAT MENISECTOMY Right 8/18/2016    Procedure: KNEE ARTHROSCOPIC PARTIAL MEDIAL MENISCECTOMY ;  Surgeon: Mendy Hopson MD;  [facility];  Service: Orthopedics    VASECTOMY      WITH REVERSAL       Current Outpatient Medications:     allopurinol (ZYLOPRIM) 100 mg tablet, Take 2 tablets daily, Disp: 180 tablet, Rfl: 2    multivitamins-fortified A-D-K (SOURCECF) solution, Take 0 5 mL by mouth daily  , Disp: , Rfl:   No Known Allergies    Vitals:    03/08/21 1048   BP: 132/84   BP Location: Left arm   Patient Position: Sitting   Cuff Size: Large   Pulse: 60   Weight: 103 kg (226 lb)   Height: 5' 10" (1 778 m)     Vitals:    03/08/21 1048   Weight: 103 kg (226 lb)      Height: 5' 10" (177 8 cm)   Body mass index is 32 43 kg/m²  Physical Exam:   GENERAL: Alert, well appearing, and in no distress  HEENT:  PERRL, EOMI, no scleral icterus, no conjunctival pallor  NECK:  Supple, No elevated JVP, no thyromegaly, no carotid bruits  HEART:  Regular rate and rhythm, normal S1/S2, no S3/S4, no murmur or rub  LUNGS:  Clear to auscultation bilaterally  ABDOMEN:  Soft, non-tender, positive bowel sounds, no rebound or guarding  EXTREMITIES:  No edema  VASCULAR:  Normal pedal pulses   NEURO: No focal deficits,  SKIN: Normal without suspicious lesions on exposed skin    ROS:  Positive for palpitations, shoulder pain  Except as noted in HPI, is otherwise reviewed in detail and a 12 point review of systems is negative  Labs:  Lab Results   Component Value Date     02/15/2017    K 4 4 06/12/2020     06/12/2020    CREATININE 1 15 06/12/2020    BUN 21 06/12/2020    CO2 16 (L) 06/12/2020    ALT 36 06/12/2020    AST 34 06/12/2020    TSH 1 930 06/12/2020    HGBA1C 5 9 (H) 06/12/2020    WBC 5 2 06/12/2020    HGB 15 0 06/12/2020    HCT 45 6 06/12/2020     06/12/2020     Lab Results   Component Value Date    CHOL 186 02/15/2017    CHOL 188 02/26/2016    CHOL 193 12/04/2014     Lab Results   Component Value Date    HDL 50 06/12/2020    HDL 44 12/21/2018    HDL 46 02/15/2017     Lab Results   Component Value Date    LDLCALC 92 06/12/2020    LDLCALC 73 12/21/2018     Lab Results   Component Value Date    TRIG 185 (H) 06/12/2020    TRIG 97 12/21/2018    TRIG 108 02/15/2017     EKG:  Sinus rhythm  60 beats per minute  Normal EKG

## 2021-03-22 ENCOUNTER — RA CDI HCC (OUTPATIENT)
Dept: OTHER | Facility: HOSPITAL | Age: 56
End: 2021-03-22

## 2021-03-22 NOTE — PROGRESS NOTES
[facility]  coding oppertunities          Chart reviewed, no opportunity found: CHART REVIEWED, NO OPPORTUNITY FOUND

## 2021-03-30 DIAGNOSIS — Z23 ENCOUNTER FOR IMMUNIZATION: ICD-10-CM

## 2021-04-05 ENCOUNTER — TELEPHONE (OUTPATIENT)
Dept: CARDIOLOGY CLINIC | Facility: CLINIC | Age: 56
End: 2021-04-05

## 2021-04-05 DIAGNOSIS — R07.9 CHEST PAIN, UNSPECIFIED TYPE: Primary | ICD-10-CM

## 2021-04-05 NOTE — TELEPHONE ENCOUNTER
Authorization request of Stress Echo has been denied by insurance  Per insurance  no documentation of why patient cannot have a treadmill stress test or prior history of false positive treadmill result  Peer to peer review is offered #[phone] ref# [id number]

## 2021-04-11 ENCOUNTER — IMMUNIZATIONS (OUTPATIENT)
Dept: FAMILY MEDICINE CLINIC | Facility: HOSPITAL | Age: 56
End: 2021-04-11

## 2021-04-11 DIAGNOSIS — Z23 ENCOUNTER FOR IMMUNIZATION: Primary | ICD-10-CM

## 2021-04-11 PROCEDURE — 0001A SARS-COV-2 / COVID-19 MRNA VACCINE (PFIZER-BIONTECH) 30 MCG: CPT

## 2021-04-11 PROCEDURE — 91300 SARS-COV-2 / COVID-19 MRNA VACCINE (PFIZER-BIONTECH) 30 MCG: CPT

## 2021-05-02 ENCOUNTER — IMMUNIZATIONS (OUTPATIENT)
Dept: FAMILY MEDICINE CLINIC | Facility: HOSPITAL | Age: 56
End: 2021-05-02

## 2021-05-02 DIAGNOSIS — Z23 ENCOUNTER FOR IMMUNIZATION: Primary | ICD-10-CM

## 2021-05-02 PROCEDURE — 91300 SARS-COV-2 / COVID-19 MRNA VACCINE (PFIZER-BIONTECH) 30 MCG: CPT

## 2021-05-02 PROCEDURE — 0002A SARS-COV-2 / COVID-19 MRNA VACCINE (PFIZER-BIONTECH) 30 MCG: CPT

## 2021-05-28 LAB
ALBUMIN SERPL-MCNC: 3.9 G/DL (ref 3.8–4.9)
ALBUMIN/GLOB SERPL: 1.2 {RATIO} (ref 1.2–2.2)
ALP SERPL-CCNC: 52 IU/L (ref 48–121)
ALT SERPL-CCNC: 39 IU/L (ref 0–44)
AST SERPL-CCNC: 34 IU/L (ref 0–40)
BILIRUB SERPL-MCNC: 0.4 MG/DL (ref 0–1.2)
BUN SERPL-MCNC: 18 MG/DL (ref 6–24)
BUN/CREAT SERPL: 17 (ref 9–20)
CALCIUM SERPL-MCNC: 9.1 MG/DL (ref 8.7–10.2)
CHLORIDE SERPL-SCNC: 103 MMOL/L (ref 96–106)
CHOLEST SERPL-MCNC: 173 MG/DL (ref 100–199)
CO2 SERPL-SCNC: 23 MMOL/L (ref 20–29)
CREAT SERPL-MCNC: 1.06 MG/DL (ref 0.76–1.27)
EST. AVERAGE GLUCOSE BLD GHB EST-MCNC: 117 MG/DL
GLOBULIN SER-MCNC: 3.2 G/DL (ref 1.5–4.5)
GLUCOSE SERPL-MCNC: 101 MG/DL (ref 65–99)
HBA1C MFR BLD: 5.7 % (ref 4.8–5.6)
HCV AB S/CO SERPL IA: <0.1 S/CO RATIO (ref 0–0.9)
HDLC SERPL-MCNC: 53 MG/DL
HIV 1+2 AB+HIV1 P24 AG SERPL QL IA: NON REACTIVE
LDLC SERPL CALC-MCNC: 103 MG/DL (ref 0–99)
LDLC/HDLC SERPL: 1.9 RATIO (ref 0–3.6)
POTASSIUM SERPL-SCNC: 4.2 MMOL/L (ref 3.5–5.2)
PROT SERPL-MCNC: 7.1 G/DL (ref 6–8.5)
SL AMB EGFR AFRICAN AMERICAN: 91 ML/MIN/1.73
SL AMB EGFR NON AFRICAN AMERICAN: 79 ML/MIN/1.73
SL AMB VLDL CHOLESTEROL CALC: 17 MG/DL (ref 5–40)
SODIUM SERPL-SCNC: 139 MMOL/L (ref 134–144)
TRIGL SERPL-MCNC: 90 MG/DL (ref 0–149)
URATE SERPL-MCNC: 6.8 MG/DL (ref 3.8–8.4)

## 2021-07-01 ENCOUNTER — VBI (OUTPATIENT)
Dept: ADMINISTRATIVE | Facility: OTHER | Age: 56
End: 2021-07-01

## 2021-07-06 DIAGNOSIS — M1A.9XX0 CHRONIC GOUT WITHOUT TOPHUS, UNSPECIFIED CAUSE, UNSPECIFIED SITE: ICD-10-CM

## 2021-07-06 DIAGNOSIS — E79.0 HYPERURICEMIA: ICD-10-CM

## 2021-07-11 RX ORDER — ALLOPURINOL 100 MG/1
TABLET ORAL
Qty: 180 TABLET | Refills: 2 | Status: SHIPPED | OUTPATIENT
Start: 2021-07-11

## 2022-07-26 ENCOUNTER — VBI (OUTPATIENT)
Dept: ADMINISTRATIVE | Facility: OTHER | Age: 57
End: 2022-07-26

## 2022-09-16 DIAGNOSIS — E79.0 HYPERURICEMIA: ICD-10-CM

## 2022-09-16 DIAGNOSIS — M1A.9XX0 CHRONIC GOUT WITHOUT TOPHUS, UNSPECIFIED CAUSE, UNSPECIFIED SITE: ICD-10-CM

## 2022-09-19 RX ORDER — ALLOPURINOL 100 MG/1
TABLET ORAL
Qty: 60 TABLET | Refills: 0 | Status: SHIPPED | OUTPATIENT
Start: 2022-09-19 | End: 2022-10-21 | Stop reason: SDUPTHER

## 2022-09-19 NOTE — TELEPHONE ENCOUNTER
Hernandez Ruano MD  You 51 minutes ago (9:26 AM)     TAVO    Will refill 1 month supply  Patient needs to keep appointment on 9/22

## 2022-10-14 ENCOUNTER — RA CDI HCC (OUTPATIENT)
Dept: OTHER | Facility: HOSPITAL | Age: 57
End: 2022-10-14

## 2022-10-14 NOTE — PROGRESS NOTES
[facility] 75  coding opportunities       Chart reviewed, no opportunity found: CHART REVIEWED, NO OPPORTUNITY FOUND        Patients Insurance        Commercial Insurance: Yamileth Taylor

## 2022-10-21 ENCOUNTER — OFFICE VISIT (OUTPATIENT)
Dept: FAMILY MEDICINE CLINIC | Facility: OTHER | Age: 57
End: 2022-10-21
Payer: COMMERCIAL

## 2022-10-21 VITALS
BODY MASS INDEX: 31.92 KG/M2 | RESPIRATION RATE: 16 BRPM | WEIGHT: 223 LBS | HEART RATE: 88 BPM | TEMPERATURE: 98 F | DIASTOLIC BLOOD PRESSURE: 82 MMHG | OXYGEN SATURATION: 98 % | HEIGHT: 70 IN | SYSTOLIC BLOOD PRESSURE: 120 MMHG

## 2022-10-21 DIAGNOSIS — Z00.00 ANNUAL PHYSICAL EXAM: Primary | ICD-10-CM

## 2022-10-21 DIAGNOSIS — E78.1 HYPERTRIGLYCERIDEMIA: ICD-10-CM

## 2022-10-21 DIAGNOSIS — R10.11 RUQ PAIN: ICD-10-CM

## 2022-10-21 DIAGNOSIS — G47.30 SLEEP-DISORDERED BREATHING: ICD-10-CM

## 2022-10-21 DIAGNOSIS — M1A.9XX0 CHRONIC GOUT WITHOUT TOPHUS, UNSPECIFIED CAUSE, UNSPECIFIED SITE: ICD-10-CM

## 2022-10-21 DIAGNOSIS — E79.0 HYPERURICEMIA: ICD-10-CM

## 2022-10-21 DIAGNOSIS — Z23 NEED FOR VACCINATION: ICD-10-CM

## 2022-10-21 DIAGNOSIS — R73.03 PREDIABETES: ICD-10-CM

## 2022-10-21 PROCEDURE — 90471 IMMUNIZATION ADMIN: CPT

## 2022-10-21 PROCEDURE — 90682 RIV4 VACC RECOMBINANT DNA IM: CPT

## 2022-10-21 PROCEDURE — 99396 PREV VISIT EST AGE 40-64: CPT | Performed by: FAMILY MEDICINE

## 2022-10-21 RX ORDER — ALLOPURINOL 100 MG/1
TABLET ORAL
Qty: 60 TABLET | Refills: 0 | Status: SHIPPED | OUTPATIENT
Start: 2022-10-21

## 2022-10-21 NOTE — PROGRESS NOTES
ADULT ANNUAL [address]  [facility]    NAME: Tremaine Castillo  AGE: 65 y o  SEX: male  : 1965     DATE: 10/21/2022     Assessment and Plan:     Problem List Items Addressed This Visit        Other    Gout    Relevant Medications    allopurinol (ZYLOPRIM) 100 mg tablet    Hypertriglyceridemia    Relevant Orders    Lipid Panel with Direct LDL reflex    Hyperuricemia    Relevant Medications    allopurinol (ZYLOPRIM) 100 mg tablet    Other Relevant Orders    Uric acid    Prediabetes    Relevant Orders    Comprehensive metabolic panel    Hemoglobin A1C      Other Visit Diagnoses     Annual physical exam    -  Primary    RUQ pain        Relevant Orders    US right upper quadrant    Sleep-disordered breathing        Relevant Orders    Ambulatory Referral to Sleep Medicine    Need for vaccination        Relevant Orders    influenza vaccine, quadrivalent, recombinant, PF, 0 5 mL (Completed)        Palpitations: Will check electrolytes and routine screening labs  Advised patient to schedule previously ordered stress test   RUQ pain: History of hepatic cysts  Possible biliary cause  Screening labs  Repeat RUQ US  STOP-BANG Score of 3  Referred to sleep medicine  Immunizations and preventive care screenings were discussed with patient today  Appropriate education was printed on patient's after visit summary  Discussed risks and benefits of prostate cancer screening  We discussed the controversial history of PSA screening for prostate cancer in the [place] as well as the risk of over detection and over treatment of prostate cancer by way of PSA screening  The patient understands that PSA blood testing is an imperfect way to screen for prostate cancer and that elevated PSA levels in the blood may also be caused by infection, inflammation, prostatic trauma or manipulation, urological procedures, or by benign prostatic enlargement      The role of the digital rectal examination in prostate cancer screening was also discussed and I discussed with him that there is large interobserver variability in the findings of digital rectal examination  Counseling:  Alcohol/drug use: discussed moderation in alcohol intake, the recommendations for healthy alcohol use, and avoidance of illicit drug use  Dental Health: discussed importance of regular tooth brushing, flossing, and dental visits  Injury prevention: discussed safety/seat belts, safety helmets, smoke detectors, carbon dioxide detectors, and smoking near bedding or upholstery  Sexual health: discussed sexually transmitted diseases, partner selection, use of condoms, avoidance of unintended pregnancy, and contraceptive alternatives  Exercise: the importance of regular exercise/physical activity was discussed  Recommend exercise 3-5 times per week for at least 30 minutes  BMI Counseling: Body mass index is 32 kg/m²  The BMI is above normal  Nutrition recommendations include encouraging healthy choices of fruits and vegetables  Exercise recommendations include moderate physical activity 150 minutes/week  No pharmacotherapy was ordered  Rationale for BMI follow-up plan is due to patient being overweight or obese  Depression Screening and Follow-up Plan: Patient was screened for depression during today's encounter  They screened negative with a PHQ-2 score of 0            Emotional and Mental Well-being, Sleep, Connectedness Assessment and Intervention:    Sleep/stress assessment performed    Depression and anxiety screening performed and reviewed    Sleep Medicine referral given      Tobacco and Toxic Substance Assessment and Intervention:     Tobacco use screening performed    Alcohol and drug use screening performed    Brief intervention performed for tobacco, alcohol, or drug use      Therapeutic Lifestyle Change Visit:     One-on-one comprehensive counseling, coaching, and health behavior change visit completed        Return in about 1 year (around 10/21/2023) for Annual physical      Chief Complaint:     Chief Complaint   Patient presents with   • Physical Exam      History of Present Illness:     Adult Annual Physical   Patient here for a comprehensive physical exam  The patient reports problems - palpitations and RUQ pain  Palpitations/fluttering about weekly  RUQ a couple times per week not associated with eating  Diet and Physical Activity  Diet/Nutrition: limited fruits/vegetables  Exercise: 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  Depression Screening  PHQ-2/9 Depression Screening    Little interest or pleasure in doing things: 0 - not at all  Feeling down, depressed, or hopeless: 0 - not at all  PHQ-2 Score: 0  PHQ-2 Interpretation: Negative depression screen       General Health  Sleep: sleeps well and gets 7-8 hours of sleep on average  Hearing: decreased - bilateral and right worse than left, not new  Vision: most recent eye exam <1 year ago, wears glasses and reading glasses  Dental: regular dental visits and brushes teeth once daily   Health  Symptoms include: none     Review of Systems:     Review of Systems   Constitutional: Negative for chills, fever and unexpected weight change  HENT: Negative for congestion, rhinorrhea and sore throat  Eyes: Positive for discharge (slight, occasional)  Negative for visual disturbance  Respiratory: Negative for cough, shortness of breath and wheezing  Cardiovascular: Positive for palpitations  Negative for chest pain and leg swelling  Gastrointestinal: Negative for abdominal pain, blood in stool, constipation, diarrhea, nausea and vomiting  Genitourinary: Negative for difficulty urinating, dysuria and hematuria  Musculoskeletal: Negative for arthralgias and myalgias  Skin: Negative for rash  Neurological: Negative for dizziness, light-headedness and headaches     Psychiatric/Behavioral: Negative for dysphoric mood    All other systems reviewed and are negative  Past Medical History:     Past Medical History:   Diagnosis Date   • Gout    • Hypertension    • Male erectile dysfunction     [id number] RESOLVED   • Wrist fracture     LEFT      Past Surgical History:     Past Surgical History:   Procedure Laterality Date   • COLONOSCOPY N/A 6/10/2016    Procedure: COLONOSCOPY;  Surgeon: Myriam Caputo MD;  Location: [place] [id number] [facility]; Service:    • KNEE ARTHROSCOPY Left     WITH MEDIAL MENISCETOMY   • TN KNEE SCOPE,MED/LAT MENISECTOMY Right 8/18/2016    Procedure: KNEE ARTHROSCOPIC PARTIAL MEDIAL MENISCECTOMY ;  Surgeon: Savanah Noble MD;  Location: [facility];  Service: Orthopedics   • VASECTOMY      WITH REVERSAL      Family History:     Family History   Problem Relation Age of Onset   • Coronary artery disease Mother    • Gout Mother    • Hypertension Mother    • Uterine cancer Mother    • Gout Father    • Hypertension Father    • Benign prostatic hyperplasia Father    • Gout Brother    • Diabetes Daughter    • Gout Daughter    • Coronary artery disease Maternal Grandmother    • Hypertension Maternal Grandmother       Social History:     Social History     Socioeconomic History   • Marital status: /Civil Union     Spouse name: None   • Number of children: None   • Years of education: SOME COLLEGE   • Highest education level: None   Occupational History   • Occupation: NETWORK TECH   Tobacco Use   • Smoking status: Never Smoker   • Smokeless tobacco: Never Used   Substance and Sexual Activity   • Alcohol use:  Yes     Alcohol/week: 1 0 standard drink     Types: 1 Glasses of wine per week     Comment: 0-2 drinks per wk    • Drug use: No   • Sexual activity: Yes     Partners: Female     Birth control/protection: Surgical   Other Topics Concern   • None   Social History Narrative   • None     Social Determinants of Health     Financial Resource Strain: Not on file   Food Insecurity: Not on file   Transportation Needs: Not on file   Physical Activity: Not on file   Stress: Not on file   Social Connections: Not on file   Intimate Partner Violence: Not on file   Housing Stability: Not on file      Current Medications:     Current Outpatient Medications   Medication Sig Dispense Refill   • allopurinol (ZYLOPRIM) 100 mg tablet Take 2 tablets daily 60 tablet 0   • multivitamins-fortified A-D-K (SOURCECF) solution Take 0 5 mL by mouth daily  No current facility-administered medications for this visit  Allergies:     No Known Allergies   Physical Exam:     /82   Pulse 88   Temp 98 °F (36 7 °C)   Resp 16   Ht 5' 10" (1 778 m)   Wt 101 kg (223 lb)   SpO2 98%   BMI 32 00 kg/m²     Physical Exam  Vitals reviewed  Constitutional:       General: He is not in acute distress  Appearance: He is not diaphoretic  HENT:      Head: Normocephalic and atraumatic  Right Ear: External ear normal       Left Ear: External ear normal       Nose: Nose normal       Mouth/Throat:      Mouth: Mucous membranes are moist       Pharynx: Oropharynx is clear  Eyes:      Extraocular Movements: Extraocular movements intact  Conjunctiva/sclera: Conjunctivae normal       Pupils: Pupils are equal, round, and reactive to light  Cardiovascular:      Rate and Rhythm: Normal rate and regular rhythm  Pulses: Normal pulses  Heart sounds: Normal heart sounds  No murmur heard  No friction rub  No gallop  Pulmonary:      Effort: Pulmonary effort is normal  No respiratory distress  Breath sounds: Normal breath sounds  No stridor  No wheezing, rhonchi or rales  Abdominal:      General: Bowel sounds are normal  There is no distension  Palpations: Abdomen is soft  There is no mass  Tenderness: There is no abdominal tenderness  There is no right CVA tenderness, left CVA tenderness or guarding  Musculoskeletal:         General: Normal range of motion  Cervical back: Normal range of motion and neck supple  Right lower leg: No edema  Left lower leg: No edema  Lymphadenopathy:      Cervical: No cervical adenopathy  Skin:     General: Skin is warm and dry  Neurological:      General: No focal deficit present  Mental Status: He is alert and oriented to person, place, and time     Psychiatric:         Mood and Affect: Mood normal          Behavior: Behavior normal          Sly Masterson MD  [place] [id number]

## 2022-10-21 NOTE — PATIENT INSTRUCTIONS
Wellness Visit for Adults   AMBULATORY CARE:   A wellness visit  is when you see your healthcare provider to get screened for health problems  Your healthcare provider will also give you advice on how to stay healthy  Write down your questions so you remember to ask them  Ask your healthcare provider how often you should have a wellness visit  What happens at a wellness visit:  Your healthcare provider will ask about your health, and your family history of health problems  This includes high blood pressure, heart disease, and cancer  He or she will ask if you have symptoms that concern you, if you smoke, and about your mood  You may also be asked about your intake of medicines, supplements, food, and alcohol  Any of the following may be done:  · Your weight  will be checked  Your height may also be checked so your body mass index (BMI) can be calculated  Your BMI shows if you are at a healthy weight  · Your blood pressure  and heart rate will be checked  Your temperature may also be checked  · Blood and urine tests  may be done  Blood tests may be done to check your cholesterol levels  Abnormal cholesterol levels increase your risk for heart disease and stroke  You may also need a blood or urine test to check for diabetes if you are at increased risk  Urine tests may be done to look for signs of an infection or kidney disease  · A physical exam  includes checking your heartbeat and lungs with a stethoscope  Your healthcare provider may also check your skin to look for sun damage  · Screening tests  may be recommended  A screening test is done to check for diseases that may not cause symptoms  The screening tests you may need depend on your age, gender, family history, and lifestyle habits  For example, colorectal screening may be recommended if you are 48years old or older  Screening tests you need if you are a woman:   · A Pap smear  is used to screen for cervical cancer   Pap smears are usually done every 3 to 5 years depending on your age  You may need them more often if you have had abnormal Pap smear test results in the past  Ask your healthcare provider how often you should have a Pap smear  · A mammogram  is an x-ray of your breasts to screen for breast cancer  Experts recommend mammograms every 2 years starting at age 48 years  You may need a mammogram at age 52 years or younger if you have an increased risk for breast cancer  Talk to your healthcare provider about when you should start having mammograms and how often you need them  Vaccines you may need:   · Get an influenza vaccine  every year  The influenza vaccine protects you from the flu  Several types of viruses cause the flu  The viruses change over time, so new vaccines are made each year  · Get a tetanus-diphtheria (Td) booster vaccine  every 10 years  This vaccine protects you against tetanus and diphtheria  Tetanus is a severe infection that may cause painful muscle spasms and lockjaw  Diphtheria is a severe bacterial infection that causes a thick covering in the back of your mouth and throat  · Get a human papillomavirus (HPV) vaccine  if you are female and aged 23 to 32 or male 23 to 24 and never received it  This vaccine protects you from HPV infection  HPV is the most common infection spread by sexual contact  HPV may also cause vaginal, penile, and anal cancers  · Get a pneumococcal vaccine  if you are aged 72 years or older  The pneumococcal vaccine is an injection given to protect you from pneumococcal disease  Pneumococcal disease is an infection caused by pneumococcal bacteria  The infection may cause pneumonia, meningitis, or an ear infection  · Get a shingles vaccine  if you are 60 or older, even if you have had shingles before  The shingles vaccine is an injection to protect you from the varicella-zoster virus  This is the same virus that causes chickenpox   Shingles is a painful rash that develops in people who had chickenpox or have been exposed to the virus  How to eat healthy:  My Plate is a model for planning healthy meals  It shows the types and amounts of foods that should go on your plate  Fruits and vegetables make up about half of your plate, and grains and protein make up the other half  A serving of dairy is included on the side of your plate  The amount of calories and serving sizes you need depends on your age, gender, weight, and height  Examples of healthy foods are listed below:  · Eat a variety of vegetables  such as dark green, red, and orange vegetables  You can also include canned vegetables low in sodium (salt) and frozen vegetables without added butter or sauces  · Eat a variety of fresh fruits , canned fruit in 100% juice, frozen fruit, and dried fruit  · Include whole grains  At least half of the grains you eat should be whole grains  Examples include whole-wheat bread, wheat pasta, brown rice, and whole-grain cereals such as oatmeal     · Eat a variety of protein foods such as seafood (fish and shellfish), lean meat, and poultry without skin (turkey and chicken)  Examples of lean meats include pork leg, shoulder, or tenderloin, and beef round, sirloin, tenderloin, and extra lean ground beef  Other protein foods include eggs and egg substitutes, beans, peas, soy products, nuts, and seeds  · Choose low-fat dairy products such as skim or 1% milk or low-fat yogurt, cheese, and cottage cheese  · Limit unhealthy fats  such as butter, hard margarine, and shortening  Exercise:  Exercise at least 30 minutes per day on most days of the week  Some examples of exercise include walking, biking, dancing, and swimming  You can also fit in more physical activity by taking the stairs instead of the elevator or parking farther away from stores  Include muscle strengthening activities 2 days each week  Regular exercise provides many health benefits   It helps you manage your weight, and decreases your risk for type 2 diabetes, heart disease, stroke, and high blood pressure  Exercise can also help improve your mood  Ask your healthcare provider about the best exercise plan for you  General health and safety guidelines:   · Do not smoke  Nicotine and other chemicals in cigarettes and cigars can cause lung damage  Ask your healthcare provider for information if you currently smoke and need help to quit  E-cigarettes or smokeless tobacco still contain nicotine  Talk to your healthcare provider before you use these products  · Limit alcohol  A drink of alcohol is 12 ounces of beer, 5 ounces of wine, or 1½ ounces of liquor  · Lose weight, if needed  Being overweight increases your risk of certain health conditions  These include heart disease, high blood pressure, type 2 diabetes, and certain types of cancer  · Protect your skin  Do not sunbathe or use tanning beds  Use sunscreen with a SPF 15 or higher  Apply sunscreen at least 15 minutes before you go outside  Reapply sunscreen every 2 hours  Wear protective clothing, hats, and sunglasses when you are outside  · Drive safely  Always wear your seatbelt  Make sure everyone in your car wears a seatbelt  A seatbelt can save your life if you are in an accident  Do not use your cell phone when you are driving  This could distract you and cause an accident  Pull over if you need to make a call or send a text message  · Practice safe sex  Use latex condoms if are sexually active and have more than one partner  Your healthcare provider may recommend screening tests for sexually transmitted infections (STIs)  · Wear helmets, lifejackets, and protective gear  Always wear a helmet when you ride a bike or motorcycle, go skiing, or play sports that could cause a head injury  Wear protective equipment when you play sports  Wear a lifejacket when you are on a boat or doing water sports      © Copyright [employer] 2022 Information is for End User's use only and may not be sold, redistributed or otherwise used for commercial purposes  All illustrations and images included in CareNotes® are the copyrighted property of [employer] M [employer]  or Cassidy Garcia  The above information is an  only  It is not intended as medical advice for individual conditions or treatments  Talk to your doctor, nurse or pharmacist before following any medical regimen to see if it is safe and effective for you  Obesity   AMBULATORY CARE:   Obesity  means your body mass index (BMI) is greater than 30  Your healthcare provider will use your height and weight to measure your BMI  The risks of obesity include  many health problems, including injuries or physical disability  · Diabetes (high blood sugar level)    · High blood pressure or high cholesterol    · Heart disease    · Stroke    · Gallbladder or liver disease    · Cancer of the colon, breast, prostate, liver, or kidney    · Sleep apnea    · Arthritis or gout    Screening  is done to check for health conditions before you have signs or symptoms  If you are 28to 79years old, your blood sugar level may be checked every 3 years for signs of prediabetes or diabetes  Your healthcare provider will check your blood pressure at each visit  High blood pressure can lead to a stroke or other problems  Your provider may check for signs of heart disease, cancer, or other health problems  Seek care immediately if:   · You have a severe headache, confusion, or difficulty speaking  · You have weakness on one side of your body  · You have chest pain, sweating, or shortness of breath  Call your doctor if:   · You have symptoms of gallbladder or liver disease, such as pain in your upper abdomen  · You have knee or hip pain and discomfort while walking  · You have symptoms of diabetes, such as intense hunger and thirst, and frequent urination      · You have symptoms of sleep apnea, such as snoring or daytime sleepiness  · You have questions or concerns about your condition or care  Treatment for obesity  focuses on helping you lose weight to improve your health  Even a small decrease in BMI can reduce the risk for many health problems  Your healthcare provider will help you set a weight-loss goal   · Lifestyle changes  are the first step in treating obesity  These include making healthy food choices and getting regular physical activity  Your healthcare provider may suggest a weight-loss program that involves coaching, education, and therapy  · Medicine  may help you lose weight when it is used with a healthy foods and physical activity  · Surgery  can help you lose weight if you are very obese and have other health problems  There are several types of weight-loss surgery  Ask your healthcare provider for more information  Tips for safe weight loss:   · Set small, realistic goals  An example of a small goal is to walk for 20 minutes 5 days a week  Anther goal is to lose 5% of your body weight  · Tell friends, family members, and coworkers about your goals  and ask for their support  Ask a friend to lose weight with you, or join a weight-loss support group  · Identify foods or triggers that may cause you to overeat , and find ways to avoid them  Remove tempting high-calorie foods from your home and workplace  Place a bowl of fresh fruit on your kitchen counter  If stress causes you to eat, then find other ways to cope with stress  A counselor or therapist may be able to help you  · Keep a diary to track what you eat and drink  Also write down how many minutes of physical activity you do each day  Weigh yourself once a week and record it in your diary  Eating changes: You will need to eat 500 to 1,000 fewer calories each day than you currently eat to lose 1 to 2 pounds a week  The following changes will help you cut calories:  · Eat smaller portions    Use small plates, no larger than 9 inches in diameter  Fill your plate half full of fruits and vegetables  Measure your food using measuring cups until you know what a serving size looks like  · Eat 3 meals and 1 or 2 snacks each day  Plan your meals in advance  Nohemi Marquez and eat at home most of the time  Eat slowly  Do not skip meals  Skipping meals can lead to overeating later in the day  This can make it harder for you to lose weight  Talk with a dietitian to help you make a meal plan and schedule that is right for you  · Eat fruits and vegetables at every meal   They are low in calories and high in fiber, which makes you feel full  Do not add butter, margarine, or cream sauce to vegetables  Use herbs to season steamed vegetables  · Eat less fat and fewer fried foods  Eat more baked or grilled chicken and fish  These protein sources are lower in calories and fat than red meat  Limit fast food  Dress your salads with olive oil and vinegar instead of bottled dressing  · Limit the amount of sugar you eat  Do not drink sugary beverages  Limit alcohol  Activity changes:  Physical activity is good for your body in many ways  It helps you burn calories and build strong muscles  It decreases stress and depression, and improves your mood  It can also help you sleep better  Talk to your healthcare provider before you begin an exercise program   · Exercise for at least 30 minutes 5 days a week  Start slowly  Set aside time each day for physical activity that you enjoy and that is convenient for you  It is best to do both weight training and an activity that increases your heart rate, such as walking, bicycling, or swimming  · Find ways to be more active  Do yard work and housecleaning  Walk up the stairs instead of using elevators  Spend your leisure time going to events that require walking, such as outdoor festivals or fairs  This extra physical activity can help you lose weight and keep it off         Follow up with your doctor as directed: You may need to meet with a dietitian  Write down your questions so you remember to ask them during your visits  © Copyright [employer] 2022 Information is for End User's use only and may not be sold, redistributed or otherwise used for commercial purposes  All illustrations and images included in CareNotes® are the copyrighted property of [employer] A M [employer] Inc  or [facility] [address]   The above information is an  only  It is not intended as medical advice for individual conditions or treatments  Talk to your doctor, nurse or pharmacist before following any medical regimen to see if it is safe and effective for you

## 2022-10-24 ENCOUNTER — TELEPHONE (OUTPATIENT)
Dept: PULMONOLOGY | Facility: MEDICAL CENTER | Age: 57
End: 2022-10-24

## 2022-10-24 NOTE — TELEPHONE ENCOUNTER
LM for patient to call office back to schedule NP for sleep disordered breathing with Dr Radha Bonner

## 2022-10-28 DIAGNOSIS — R07.9 CHEST PAIN, UNSPECIFIED TYPE: ICD-10-CM

## 2022-10-28 DIAGNOSIS — R00.2 PALPITATIONS: Primary | ICD-10-CM

## 2022-11-01 DIAGNOSIS — M1A.9XX0 CHRONIC GOUT WITHOUT TOPHUS, UNSPECIFIED CAUSE, UNSPECIFIED SITE: ICD-10-CM

## 2022-11-01 DIAGNOSIS — R00.2 PALPITATIONS: ICD-10-CM

## 2022-11-01 DIAGNOSIS — R07.9 CHEST PAIN, UNSPECIFIED TYPE: Primary | ICD-10-CM

## 2022-11-01 DIAGNOSIS — E79.0 HYPERURICEMIA: ICD-10-CM

## 2022-11-01 LAB
ALBUMIN SERPL-MCNC: 4.7 G/DL (ref 3.8–4.9)
ALBUMIN/GLOB SERPL: 1.9 {RATIO} (ref 1.2–2.2)
ALP SERPL-CCNC: 53 IU/L (ref 44–121)
ALT SERPL-CCNC: 39 IU/L (ref 0–44)
AST SERPL-CCNC: 34 IU/L (ref 0–40)
BILIRUB SERPL-MCNC: 0.4 MG/DL (ref 0–1.2)
BUN SERPL-MCNC: 21 MG/DL (ref 6–24)
BUN/CREAT SERPL: 18 (ref 9–20)
CALCIUM SERPL-MCNC: 9.7 MG/DL (ref 8.7–10.2)
CHLORIDE SERPL-SCNC: 101 MMOL/L (ref 96–106)
CHOLEST SERPL-MCNC: 205 MG/DL (ref 100–199)
CO2 SERPL-SCNC: 24 MMOL/L (ref 20–29)
CREAT SERPL-MCNC: 1.15 MG/DL (ref 0.76–1.27)
EGFR: 75 ML/MIN/1.73
EST. AVERAGE GLUCOSE BLD GHB EST-MCNC: 117 MG/DL
GLOBULIN SER-MCNC: 2.5 G/DL (ref 1.5–4.5)
GLUCOSE SERPL-MCNC: 85 MG/DL (ref 70–99)
HBA1C MFR BLD: 5.7 % (ref 4.8–5.6)
HDLC SERPL-MCNC: 57 MG/DL
LDLC SERPL CALC-MCNC: 122 MG/DL (ref 0–99)
LDLC/HDLC SERPL: 2.1 RATIO (ref 0–3.6)
POTASSIUM SERPL-SCNC: 4.7 MMOL/L (ref 3.5–5.2)
PROT SERPL-MCNC: 7.2 G/DL (ref 6–8.5)
SL AMB VLDL CHOLESTEROL CALC: 26 MG/DL (ref 5–40)
SODIUM SERPL-SCNC: 140 MMOL/L (ref 134–144)
TRIGL SERPL-MCNC: 147 MG/DL (ref 0–149)
URATE SERPL-MCNC: 6.9 MG/DL (ref 3.8–8.4)

## 2022-11-01 RX ORDER — ALLOPURINOL 300 MG/1
300 TABLET ORAL DAILY
Qty: 30 TABLET | Refills: 2 | Status: SHIPPED | OUTPATIENT
Start: 2022-11-01 | End: 2023-01-30

## 2022-11-02 ENCOUNTER — HOSPITAL ENCOUNTER (OUTPATIENT)
Dept: ULTRASOUND IMAGING | Facility: HOSPITAL | Age: 57
Discharge: HOME | End: 2022-11-02

## 2022-11-02 DIAGNOSIS — R10.11 RUQ PAIN: ICD-10-CM

## 2022-11-04 NOTE — RESULT ENCOUNTER NOTE
Ultrasound shows moderate hepatic steatosis (fatty liver)  Liver cysts are seen but there were no suspicious masses

## 2022-11-10 ENCOUNTER — HOSPITAL ENCOUNTER (OUTPATIENT)
Dept: NON INVASIVE DIAGNOSTICS | Facility: HOSPITAL | Age: 57
Discharge: HOME | End: 2022-11-10

## 2022-11-10 DIAGNOSIS — R00.2 PALPITATIONS: ICD-10-CM

## 2022-11-10 DIAGNOSIS — R07.9 CHEST PAIN, UNSPECIFIED TYPE: ICD-10-CM

## 2022-11-10 LAB
BASELINE ST DEPRESSION: 0 MM
CHEST PAIN STATEMENT: NORMAL
MAX DIASTOLIC BP: 82 MMHG
MAX HEART RATE: 176 BPM
MAX HR PERCENT: 107 %
MAX HR: 176 BPM
MAX PREDICTED HEART RATE: 164 BPM
MAX. SYSTOLIC BP: 206 MMHG
PROTOCOL NAME: NORMAL
RATE PRESSURE PRODUCT: NORMAL
REASON FOR TERMINATION: NORMAL
SL CV STRESS RECOVERY BP: NORMAL MMHG
SL CV STRESS RECOVERY HR: 105 BPM
SL CV STRESS RECOVERY O2 SAT: 98 %
SL CV STRESS STAGE REACHED: 4
STRESS ANGINA INDEX: 0
STRESS BASELINE BP: NORMAL MMHG
STRESS BASELINE HR: 81 BPM
STRESS DUKE TREADMILL SCORE: 10
STRESS O2 SAT REST: 98 %
STRESS PEAK HR: 176 BPM
STRESS POST ESTIMATED WORKLOAD: 13.4 METS
STRESS POST EXERCISE DUR MIN: 10 MIN
STRESS POST O2 SAT PEAK: 97 %
STRESS POST PEAK BP: 206 MMHG
STRESS ST DEPRESSION: 0 MM
TARGET HR FORMULA: NORMAL
TIME IN EXERCISE PHASE: NORMAL

## 2022-11-11 LAB
ATRIAL RATE: 114 BPM
P AXIS: 74 DEGREES
PR INTERVAL: 140 MS
QRS AXIS: 78 DEGREES
QRSD INTERVAL: 96 MS
QT INTERVAL: 300 MS
QTC INTERVAL: 413 MS
T WAVE AXIS: 50 DEGREES
VENTRICULAR RATE: 114 BPM

## 2022-12-08 ENCOUNTER — OFFICE VISIT (OUTPATIENT)
Dept: GASTROENTEROLOGY | Facility: CLINIC | Age: 57
End: 2022-12-08

## 2022-12-08 VITALS
SYSTOLIC BLOOD PRESSURE: 132 MMHG | TEMPERATURE: 96 F | OXYGEN SATURATION: 97 % | HEART RATE: 58 BPM | HEIGHT: 71 IN | WEIGHT: 222.6 LBS | DIASTOLIC BLOOD PRESSURE: 90 MMHG | BODY MASS INDEX: 31.16 KG/M2

## 2022-12-08 DIAGNOSIS — F10.10 ALCOHOL USE DISORDER, MILD, ABUSE: ICD-10-CM

## 2022-12-08 DIAGNOSIS — K76.0 HEPATIC STEATOSIS: Primary | ICD-10-CM

## 2022-12-08 DIAGNOSIS — K76.89 HEPATIC CYST: ICD-10-CM

## 2022-12-08 NOTE — PATIENT INSTRUCTIONS
Labs at your earliest convenience  Schedule US elastography in 3-6 months once you have cut down alcohol     For patients with fatty liver disease, weight loss through diet and exercise is recommended  To MAINTAIN weight you must use up at least as many calories as you take in  To LOSE weight you must use up more calories than you take in  Start by knowing how many calories you should be eating and drinking to maintain your weight  Nutrition and calorie information on food labels is typically based on a 2,000 calorie diet  You may need fewer or more calories depending on several factors including age, gender, and level of physical activity  Increase the amount and intensity of your physical activity to match the number of calories you take in  Aim for at least 150 minutes of moderate physical activity or 75 minutes of vigorous physical activity - or an equal combination of both - each week  Regular physical activity can help you maintain your weight, keep off weight that you lose and help you reach physical and cardiovascular fitness  If it's hard to schedule regular exercise sessions, try aiming for sessions of at least 10 minutes spread throughout the week  As you make daily food choices, base your eating pattern on these recommendations:   Eat a variety of fresh, frozen and canned vegetables and fruits without high-calorie sauces or added salt and sugars  Replace high-calorie foods with fruits and vegetables  Choose fiber-rich whole grains for most grain servings  Choose poultry and fish without skin and prepare them in healthy ways without added saturated and trans fat  If you choose to eat meat, look for the leanest cuts available and prepare them in healthy and delicious ways  Eat a variety of fish at least twice a week, especially fish containing omega-3 fatty acids (for example, salmon, trout and herring)  Select fat-free (skim) and low-fat (1%) dairy products     Avoid foods containing partially hydrogenated vegetable oils to reduce trans fat in your diet  Limit saturated fat and trans fat and replace them with the better fats, monounsaturated and polyunsaturated  If you need to lower your blood cholesterol, reduce saturated fat to no more than 5 to 6 percent of total calories  For someone eating 2,000 calories a day, that's about 13 grams of saturated fat  Cut back on beverages and foods with added sugars  Choose foods with less sodium and prepare foods with little or no salt  To prevent fluid problems in the legs and abdomen aim to eat no more than 2,000 milligrams of sodium per day  If you can't meet these goals right now, even reducing sodium intake by 1,000 mg per day can benefit blood pressure  If you drink alcohol, drink in moderation  That means no more than one drink per day if you're a woman and no more than two drinks per day if you're a man  Please note that the effect of alcohol on NAFLD is unclear  Some studies show that moderate alcohol use may be beneficial, but others show no benefit and even harm  Discuss specific recommendations with your hepatologist    Studies have shown that drinking up to 4 cups of caffeinated coffee per day may reduce progression of liver disease and development of liver cancer  If you have cirrhosis of the liver avoid raw shellfish such as oysters and clams  These can carry a specific bacteria that can be very harmful in liver disease    For more information on Fatty Liver Disease and Prevention please see this video from the [address] [facility] [address] [facility] [address]: [employer]an     For more information on healthy eating, please visit the American Heart Association website: Rachel degroot     For tips on physical activity, please visit the American Heart Association website: [employer]     If you are looking for additional local support, education or are ready to take action to end liver disease, contact the [address]  The [employer] is the nation's leading nonprofit focusing on providing high-quality education and support services for the prevention, treatment and cure of over 100 liver diseases  You can learn more by visiting [email or web address]  org/[email or web address]      Patient will schedule US/CT-Scan

## 2022-12-08 NOTE — PROGRESS NOTES
[facility] - Outpatient Consultation  Polo Michaels 65 y o  male MRN: [id number]  Encounter: [id number]    PCP:  Joselito Calero MD, [phone]  Referring Provider:  No ref  provider found,     Patient: Polo Michaels, 1965  Reason for Referral: hepatic steatosis     ASSESSMENT/PLAN:  65 y o  male with history of HTN and gout who presents for hepatic steatosis and hepatic cysts  He has a longstanding history of hepatic cysts seen on CT dating back to 2015  I reviewed his most recent RUQ US which demonstrated interval enlargement of one cyst in the hepatic lobe  His cysts appears to be simple cyst but would obtain repeat cross-sectional imaging to confirm this  I do not suspect that his cysts are the cause of his RUQ pain  Regarding his hepatic steatosis, suspect that he has a combination of ALD and NAFLD given his metabolic risk factors and EtOH use history  His liver tests are borderline elevated but synthetic function appears to be preserved  Will repeat his labs and complete his serologic work-up  He should also have an ultrasound elastography to complete staging but I discussed that this should be done after a period of sobriety, as active EtOH use can overestimate liver stiffness  We discussed the importance of weight loss and complete EtOH abstinence in preventing progression of liver disease and its complications  I also stated that EtOH abstinence may also help improve his HTN and dyslipidemia  He appears motivated and has good social support, with no high risk psychosocial factors  He should avoid interval weight gain and hepatotoxic medications  He will follow up in clinic in 6 months or sooner if needed  Thank you for the opportunity to consult in his care      - CBC and INR   - CT A/P with IV contrast  - HAV IgG, HBsAg, HBcAb, and HBsAb; vaccinate if non-immune  - Ceruloplasmin  - US elastography after a 3 month period of sobriety     Nga Blancas MD  Division of Gastroenterology and Hepatology  [address]    ============================================================================  CC/HPI: 65 y o  male with history of HTN and gout who presents for hepatic steatosis  He was noted to have multiple hepatic cysts measuring <1 cm on CT dating back to 2015  More recently, he has developed intermittent RUQ pain but is not triggered by food intake  He had a RUQ US which showed moderate hepatic steatosis with multiple hepatic cysts with normal appearing gallbladder and without biliary ductal dilatation  He reports history of EtOH use with reported consumption of ~25 drinks weekly  States that his consumption increased in 2017 when he was laid off from work  He denies history of DUI or work-related issues due to EtOH, but does note family history of alcoholism  He denies tobacco and recreational drug use  Regarding his weight, his lowest weight was 203 lbs in 2017 when he was actively working out  ROS: Complete review of systems otherwise negative  PAST MEDICAL/SURGICAL HISTORY:  Past Medical History:   Diagnosis Date   • Gout    • Hypertension    • Male erectile dysfunction     [id number] RESOLVED   • Wrist fracture     LEFT        Past Surgical History:   Procedure Laterality Date   • COLONOSCOPY N/A 6/10/2016    Procedure: COLONOSCOPY;  Surgeon: Alissa Singleton MD;  Location: [facility] GI LAB;   Service:    • KNEE ARTHROSCOPY Left     WITH MEDIAL MENISCETOMY   • VT KNEE SCOPE,MED/LAT MENISECTOMY Right 8/18/2016    Procedure: KNEE ARTHROSCOPIC PARTIAL MEDIAL MENISCECTOMY ;  Surgeon: Bin Lara MD;  Location: [facility];  Service: Orthopedics   • VASECTOMY      WITH REVERSAL       FAMILY/SOCIAL HISTORY:  Family History   Problem Relation Age of Onset   • Coronary artery disease Mother    • Gout Mother    • Hypertension Mother    • Uterine cancer Mother    • Gout Father    • Hypertension Father    • Benign prostatic hyperplasia Father    • Gout Brother • Diabetes Daughter    • Gout Daughter    • Coronary artery disease Maternal Grandmother    • Hypertension Maternal Grandmother        Social History     Tobacco Use   • Smoking status: Never   • Smokeless tobacco: Never   Substance Use Topics   • Alcohol use: Yes     Alcohol/week: 1 0 standard drink     Types: 1 Glasses of wine per week     Comment: 0-2 drinks per wk    • Drug use: No       MEDICATIONS:  Current Outpatient Medications on File Prior to Visit   Medication Sig Dispense Refill   • allopurinol (ZYLOPRIM) 300 mg tablet Take 1 tablet (300 mg total) by mouth daily 30 tablet 2   • multivitamins-fortified A-D-K (SOURCECF) solution Take 0 5 mL by mouth daily  No current facility-administered medications on file prior to visit  No Known Allergies    PHYSICAL EXAM:  /90 (BP Location: Left arm, Patient Position: Sitting, Cuff Size: Large)   Pulse 58   Temp (!) 96 °F (35 6 °C) (Tympanic)   Ht 5' 11" (1 803 m)   Wt 101 kg (222 lb 9 6 oz)   SpO2 97%   BMI 31 05 kg/m²   GENERAL: NAD, AAO  HEENT: anicteric, OP clear, MMM  PULMONARY: CTAB  CARDIAC: RRR, no murmurs  ABDOMEN: S/ND/NT, normoactive BS, no hepatomegaly, spleen not palpable  EXTREMITIES: no edema  SKIN: no rashes, no palmar erythema, no spider angiomata   NEURO: normal gait, no tremor, no asterixis     LABS/RADIOLOGY/ENDOSCOPY:  AST 34, ALT 39, Alb 4 7       Lab Results   Component Value Date    WBC 5 2 06/12/2020    HGB 15 0 06/12/2020    HCT 45 6 06/12/2020     06/12/2020    BUN 21 10/31/2022    CREATININE 1 15 10/31/2022     02/15/2017    K 4 7 10/31/2022     10/31/2022    CO2 24 10/31/2022    PROT 7 1 02/15/2017    ALKPHOS 47 02/15/2017    ALT 39 10/31/2022    AST 34 10/31/2022    GLOB 2 5 10/31/2022    CALCIUM 9 0 02/15/2017    EGFR 75 10/31/2022    CHOL 186 02/15/2017    TRIG 147 10/31/2022    HDL 57 10/31/2022     Abdominal ultrasound (11/2022)  Moderate hepatic steatosis   Hepatic cysts, 1 measuring >2 cm     Computed MELD-Na score unavailable  Necessary lab results were not found in the last year  Computed MELD score unavailable  Necessary lab results were not found in the last year

## 2023-03-15 ENCOUNTER — OFFICE VISIT (OUTPATIENT)
Dept: DENTISTRY | Facility: CLINIC | Age: 58
End: 2023-03-15

## 2023-03-15 DIAGNOSIS — K08.89 TOOTH PAIN: Primary | ICD-10-CM

## 2023-03-15 NOTE — DENTAL PROCEDURE DETAILS
Limited Exam, 1 PA LR  Pt presents today for tooth discomfort  Pt reports he has been seeing another dentist  ( Dr Angelica Ogden)  Pt saw her yesterday for tooth pain that just started on LR  She informed patient that LR tooth has a vertical fracture and requires extraction  Dentist referred pt for oral surgeon for extraction  Pt brought PA from 3/14/23 and FMX from 9/8/21  Pain level 5  More discomfort to hot, cold and pressure    - pt's last cleaning was January  Due to cost, lack of dental insurance pt would like to transfer to this office  Dr Michelle Diana exam:  Pt reports he started discomfort from popcorn kernel  Pt reports no pain at night  Dr Michelle Diana did not find clear crack visible, only craze lines present  Discussed options of RCT/crown or EXT and implant  Dr Michelle Diana referred pt to [facility] for implant #31 and to finalize tx plan with patient       NV= implant consultation #31 at [facility]

## 2023-03-15 NOTE — PROGRESS NOTES
Pt presents today  Pt reports he has been seeing another dentist  ( Dr Randy Woo)  Pt saw her yesterday for tooth pain that just started  She informed patient that LR tooth has a vertical fracture and requires extraction  Dentist referred pt for oral surgeon for extraction  Pt brought PA from 3/14/23 and FMX from 9/8/21  Pain level 5  More discomfort to hot, cold and pressure    - pt's last cleaning was January  Due to cost, lack of dental insurance pt would like to transfer to this office

## 2023-03-21 ENCOUNTER — OFFICE VISIT (OUTPATIENT)
Dept: DENTISTRY | Facility: CLINIC | Age: 58
End: 2023-03-21

## 2023-03-21 VITALS — DIASTOLIC BLOOD PRESSURE: 87 MMHG | SYSTOLIC BLOOD PRESSURE: 147 MMHG | HEART RATE: 69 BPM

## 2023-03-21 DIAGNOSIS — Z01.20 ENCOUNTER FOR DENTAL EXAMINATION: ICD-10-CM

## 2023-03-21 DIAGNOSIS — K08.409 PARTIAL EDENTULISM, UNSPECIFIED EDENTULISM CLASS: Primary | ICD-10-CM

## 2023-03-21 NOTE — PROGRESS NOTES
Pt presents to [facility] for implant consult in LR region  Pt was referred from [facility]  Med hx reviewed  Vitals updated  ASA: II    CC "I was told the tooth has vertical fracture and needs extraction  Based on Dr Karen Coffey exam: Dr Barbara Jean Baptiste did not find clear crack visible, only craze lines present  Today:  EOE: WNL   IOE: several missing teeth, heavily restored dentition with amalgam restorations  #4 missing, #3 RCT treated and crown  Due to teeth missing on LR #30 and #31, #3 is supra-erupted compromising restorative space for LR implants  Severe LR mandibular ridge deficiency  Pt was evaluated by Dr Klarissa Faye  Informed that LR will need bone augmentation before implants can be placed  Will need referral to [facility] for consultation  Also will need to redo #3 crown to create space for #30 and #31 implant restorations  Pt understood  Tooth #32 was tested:   Perio probing WNL  Cold test -   EPT +   Tooth is vital   Dr Klarissa Faye recommended replacement of existing amalgam restoration and monitor the tooth  Referral to [facility] for implant consult was provided along with a list of OS providers       NV: 32 O resin

## 2023-03-24 DIAGNOSIS — E79.0 HYPERURICEMIA: ICD-10-CM

## 2023-03-24 DIAGNOSIS — M1A.9XX0 CHRONIC GOUT WITHOUT TOPHUS, UNSPECIFIED CAUSE, UNSPECIFIED SITE: ICD-10-CM

## 2023-03-24 RX ORDER — ALLOPURINOL 300 MG/1
TABLET ORAL
Qty: 30 TABLET | Refills: 2 | Status: SHIPPED | OUTPATIENT
Start: 2023-03-24

## 2023-05-19 ENCOUNTER — OFFICE VISIT (OUTPATIENT)
Dept: DENTISTRY | Facility: CLINIC | Age: 58
End: 2023-05-19

## 2023-05-19 VITALS — HEART RATE: 67 BPM | SYSTOLIC BLOOD PRESSURE: 147 MMHG | DIASTOLIC BLOOD PRESSURE: 95 MMHG

## 2023-05-19 DIAGNOSIS — K08.50 DEFECTIVE DENTAL RESTORATION: Primary | ICD-10-CM

## 2023-05-19 NOTE — PROGRESS NOTES
Composite Filling    Priyanka Uriarte presents for composite filling #31 O  PMH reviewed, no changes  Pt brought printed radiographs from previous dental office from 2021  Explained to pt that he should have the front scan these in is chart/contact previous dental office to send them over digitally  Discussed that if we cannot get the radiographs digitally, it may be best to take our own during comp exam  Pt understood  Discussed with patient need for RCT if pulp exposure occurs or in future if pulp is inflamed  Pt understands and consents  Applied topical benzocaine, administered 1 carpule 2% lidocaine 1:100k epi via AMBER and long buccal nerve block and 0 5 carpule 4% articaine 1:100k epi via buccal infiltration  Prepped tooth #32 O with 245 carbide on high speed  Removed defective amaglam restoration  No fracture noted  Caries removed with round carbide on slow speed  Isolation with dri-angles  Etch with 37% H2PO4, rinse, dry  Applied Adhese with 20 second scrub once, gentle air dry and light cured for 10s  Restored with flowable composite and Tetric bulk dean shade A2  Light cured  Refined with finishing burs, polished with enhance point  Verified occlusion and contacts  Discussed w/ pt that amalgam was defective and resin may help  Explained that not all fractures can be seen, so there still may be a fracture line and he can also go to endodontist for further evaluation if wanted  Discussed w/ pt to see how new restoration helps  Pt understood  Pt states that he has appt with [facility] for implant placement and possible ext  #32  Pt left ambulatory and satisfied      NV: comp/FMX (pt may be sending radiographs from previous office)

## 2023-05-25 ENCOUNTER — OFFICE VISIT (OUTPATIENT)
Dept: DENTISTRY | Facility: CLINIC | Age: 58
End: 2023-05-25

## 2023-05-25 VITALS — HEART RATE: 69 BPM | DIASTOLIC BLOOD PRESSURE: 84 MMHG | SYSTOLIC BLOOD PRESSURE: 138 MMHG

## 2023-05-25 DIAGNOSIS — Z01.20 ENCOUNTER FOR DENTAL EXAMINATION: Primary | ICD-10-CM

## 2023-05-25 RX ORDER — NEOMYCIN SULFATE, POLYMYXIN B SULFATE, AND DEXAMETHASONE 3.5; 10000; 1 MG/G; [USP'U]/G; MG/G
OINTMENT OPHTHALMIC
COMMUNITY
Start: 2023-02-28

## 2023-05-25 RX ORDER — CEPHALEXIN 500 MG/1
CAPSULE ORAL
COMMUNITY
Start: 2023-02-28

## 2023-05-25 NOTE — DENTAL PROCEDURE DETAILS
Felisa Wallace presents for a Comprehensive exam w/ Dr Jessica Hall  Verbal consent for treatment given in addition to the forms  Reviewed health history - Patient is ASA II  Consents signed: Yes     Perio: 2A, loc 5mm UL  Pain Scale: 0  Caries Assessment: Low  Radiographs: Complete mouth series     Oral Hygiene instruction reviewed and given  Recommended 4 month Hygiene recall visits with the Ania Dudley  Full Mouth Perio charting completed  PERIO:2A  Treatment Recommended: Adult Ave    Discussed periodontal disease with patient  Patient understands and agrees to all recommended treatment discussed  Dr Jessica Hall Exam:  Rogelio Demarco for now  Patient had Srp and maintained 4mrcs at his previous office  He is currently due for a cleaning now  Patient has an appt w/ Althea RIZO for implants on LR side in July  He will bring all info w/ him to his next appt pertaining to implant parts       NV: Adult Prophgriselda

## 2023-07-28 ENCOUNTER — OFFICE VISIT (OUTPATIENT)
Dept: DENTISTRY | Facility: CLINIC | Age: 58
End: 2023-07-28

## 2023-07-28 VITALS — SYSTOLIC BLOOD PRESSURE: 150 MMHG | HEART RATE: 64 BPM | DIASTOLIC BLOOD PRESSURE: 89 MMHG

## 2023-07-28 DIAGNOSIS — K05.6 PERIODONTAL DISEASE: Primary | ICD-10-CM

## 2023-07-28 PROCEDURE — D4910 PERIODONTAL MAINTENANCE: HCPCS

## 2023-07-28 NOTE — DENTAL PROCEDURE DETAILS
Periodontal Maintenance  Reviewed hhx: no changes  ASA II    Perio chartinA - loc 5-6mm pocket in UL quad. Pt is aware and states it has been there for years. Has hx of SRP completed at previous dental office where he remained on 4 month perio mt recalls. OH: good  OHI: pt using reg floss, waterfloss and brushing daily. Gen staining from coffee. Ultrasonic, hand sc, polish and flossed today. Pt requested to floss his own teeth during appt, "I can floss quicker."    Pt expressed he has an appt at [address] [place] [address] for implant consult in 2023. Will let us know if he decides to move forward with implants for UR and LR. Informed pt if OMS placed implant posts, he must provide us with implant paper work so we can finish restoring them here. ..unless he completes the whole procedure at [facility]. Pt understands.      NV: 4 month perio mt, exam

## 2023-12-05 ENCOUNTER — OFFICE VISIT (OUTPATIENT)
Dept: DENTISTRY | Facility: CLINIC | Age: 58
End: 2023-12-05

## 2023-12-05 VITALS — SYSTOLIC BLOOD PRESSURE: 146 MMHG | DIASTOLIC BLOOD PRESSURE: 88 MMHG | HEART RATE: 56 BPM

## 2023-12-05 DIAGNOSIS — Z01.21 ENCOUNTER FOR DENTAL EXAMINATION AND CLEANING WITH ABNORMAL FINDINGS: Primary | ICD-10-CM

## 2023-12-05 PROCEDURE — D0120 PERIODIC ORAL EVALUATION - ESTABLISHED PATIENT: HCPCS

## 2023-12-05 PROCEDURE — D4910 PERIODONTAL MAINTENANCE: HCPCS

## 2023-12-05 NOTE — DENTAL PROCEDURE DETAILS
Jennifer Mcbride presents for a Periodic exam. Verbal consent for treatment given in addition to the forms. Reviewed health history - Patient is ASA II  Consents signed: Yes     Perio: Normal  Pain Scale: 0  Caries Assessment: Medium  Radiographs: None     Oral Hygiene instruction reviewed and given. Recommended Hygiene recall visits with the Sonali Rodriguez. 4 MTH PERIO MAINTENANCE     REVIEWED MED HX: meds, allergies, health changes reviewed in EPIC  CHIEF CONCERN: Patient went to [facility] for implant consult #4,30,31 and would like to wait at this time for treatment. PAIN SCALE:  0  ASA CLASS:  II  PLAQUE: moderate  -  heavy    CALCULUS:  light    BLEEDING:   light    STAIN :  moderate   ORAL HYGIENE:  fair   PERIO: 2A Patient had SRPs completed at previous office and wants to continue on 4 Mth perio Maint. Hand scaled, polished and flossed. Used Cavitron. Oral Hygiene Instruction:  recommended brushing 2 x daily for 2 minutes MIN, recommended flossing daily, reviewed dietary precautions. Visual and Tactile Intraoral/ Extraoral evaluation: Oral and Oropharyngeal cancer evaluation.  No findings     REFERRALS: no referrals provided    CARIES FINDINGS:   Nothing noted    Next Recall: 4month perio maintenance    Last FMX : 5/25/2023

## 2024-01-22 DIAGNOSIS — E79.0 HYPERURICEMIA: ICD-10-CM

## 2024-01-22 DIAGNOSIS — M1A.9XX0 CHRONIC GOUT WITHOUT TOPHUS, UNSPECIFIED CAUSE, UNSPECIFIED SITE: ICD-10-CM

## 2024-01-23 RX ORDER — ALLOPURINOL 300 MG/1
TABLET ORAL
Qty: 30 TABLET | Refills: 2 | Status: SHIPPED | OUTPATIENT
Start: 2024-01-23 | End: 2024-01-31 | Stop reason: SDUPTHER

## 2024-01-31 ENCOUNTER — OFFICE VISIT (OUTPATIENT)
Dept: FAMILY MEDICINE CLINIC | Facility: OTHER | Age: 59
End: 2024-01-31
Payer: COMMERCIAL

## 2024-01-31 VITALS
RESPIRATION RATE: 18 BRPM | OXYGEN SATURATION: 97 % | DIASTOLIC BLOOD PRESSURE: 70 MMHG | HEIGHT: 71 IN | HEART RATE: 71 BPM | BODY MASS INDEX: 33.4 KG/M2 | WEIGHT: 238.6 LBS | TEMPERATURE: 98 F | SYSTOLIC BLOOD PRESSURE: 138 MMHG

## 2024-01-31 DIAGNOSIS — M1A.9XX0 CHRONIC GOUT WITHOUT TOPHUS, UNSPECIFIED CAUSE, UNSPECIFIED SITE: ICD-10-CM

## 2024-01-31 DIAGNOSIS — Z00.00 ANNUAL PHYSICAL EXAM: ICD-10-CM

## 2024-01-31 DIAGNOSIS — E78.1 HYPERTRIGLYCERIDEMIA: ICD-10-CM

## 2024-01-31 DIAGNOSIS — R73.03 PREDIABETES: ICD-10-CM

## 2024-01-31 DIAGNOSIS — E79.0 HYPERURICEMIA: Primary | ICD-10-CM

## 2024-01-31 PROCEDURE — 99396 PREV VISIT EST AGE 40-64: CPT

## 2024-01-31 RX ORDER — ALLOPURINOL 300 MG/1
300 TABLET ORAL DAILY
Qty: 30 TABLET | Refills: 2 | Status: SHIPPED | OUTPATIENT
Start: 2024-01-31

## 2024-01-31 NOTE — PROGRESS NOTES
ADULT ANNUAL PHYSICAL  [facility]    NAME: Yousuf Anna  AGE: 58 y.o. SEX: male  : 1965     DATE: 2024     Assessment and Plan:     Problem List Items Addressed This Visit     Gout    Relevant Medications    allopurinol (ZYLOPRIM) 300 mg tablet    Other Relevant Orders    CBC and differential    Comprehensive metabolic panel    TSH + Free T4    Lipid panel    Hemoglobin A1C    Uric acid    Hyperuricemia - Primary    Relevant Medications    allopurinol (ZYLOPRIM) 300 mg tablet    Other Relevant Orders    CBC and differential    Comprehensive metabolic panel    TSH + Free T4    Lipid panel    Hemoglobin A1C    Uric acid    Prediabetes    Relevant Orders    CBC and differential    Comprehensive metabolic panel    TSH + Free T4    Lipid panel    Hemoglobin A1C    Uric acid    Hypertriglyceridemia    Relevant Orders    CBC and differential    Comprehensive metabolic panel    TSH + Free T4    Lipid panel    Hemoglobin A1C    Uric acid    BMI 33.0-33.9,adult    Relevant Orders    CBC and differential    Comprehensive metabolic panel    TSH + Free T4    Lipid panel    Hemoglobin A1C    Uric acid   Other Visit Diagnoses     Annual physical exam        Relevant Orders    CBC and differential    Comprehensive metabolic panel    TSH + Free T4    Lipid panel    Hemoglobin A1C    Uric acid          Immunizations and preventive care screenings were discussed with patient today. Appropriate education was printed on patient's after visit summary.    Discussed risks and benefits of prostate cancer screening. We discussed the controversial history of PSA screening for prostate cancer in the [place] as well as the risk of over detection and over treatment of prostate cancer by way of PSA screening.  The patient understands that PSA blood testing is an imperfect way to screen for prostate cancer and that elevated PSA levels in the blood may also be caused  by infection, inflammation, prostatic trauma or manipulation, urological procedures, or by benign prostatic enlargement.    The role of the digital rectal examination in prostate cancer screening was also discussed and I discussed with him that there is large interobserver variability in the findings of digital rectal examination.    Counseling:  Alcohol/drug use: discussed moderation in alcohol intake, the recommendations for healthy alcohol use, and avoidance of illicit drug use.  Dental Health: discussed importance of regular tooth brushing, flossing, and dental visits.  Injury prevention: discussed safety/seat belts, safety helmets, smoke detectors, carbon dioxide detectors, and smoking near bedding or upholstery.  Sexual health: discussed sexually transmitted diseases, partner selection, use of condoms, avoidance of unintended pregnancy, and contraceptive alternatives.  Exercise: the importance of regular exercise/physical activity was discussed. Recommend exercise 3-5 times per week for at least 30 minutes.          Return in 1 month (on 2/29/2024) for Recheck.     Chief Complaint:     Chief Complaint   Patient presents with   • Physical Exam     Med refill      History of Present Illness:     Adult Annual Physical   Patient here for a comprehensive physical exam. 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  He does a lot of strenuous labor during his job and has not had structured exercise.  Discussed smart goal of initiating exercise 2-3 times per week for one hour, and will follow up in 1 month to discuss goals .  Nutrition Assessment and Intervention:     Online resources such as NutritionFacts.org, NextVR.com, plantsIntuity Medical.com, pcrm.org, or similar provided to patient      Physical Activity Assessment and Intervention:    Physical Activity patient handout reviewed with patient      Diet and Physical  Activity  Diet/Nutrition: well balanced diet.   Exercise: no formal exercise.      Depression Screening  PHQ-2/9 Depression Screening    Little interest or pleasure in doing things: 0 - not at all  Feeling down, depressed, or hopeless: 0 - not at all  PHQ-2 Score: 0  PHQ-2 Interpretation: Negative depression screen       General Health  Sleep: gets 4-6 hours of sleep on average.   Hearing: normal - bilateral.  Vision:  macular degeneration, wears prescription glasses, follows optometry .   Dental: regular dental visits.        Health  Symptoms include: none    Advanced Care Planning  Do you have an advanced directive? yes       Review of Systems:     Review of Systems   Constitutional:  Negative for chills and fever.   HENT:  Negative for ear pain and sore throat.    Eyes:  Negative for pain and visual disturbance.   Respiratory:  Negative for cough and shortness of breath.    Cardiovascular:  Negative for chest pain and palpitations.   Gastrointestinal:  Negative for abdominal pain and vomiting.   Genitourinary:  Negative for dysuria and hematuria.   Musculoskeletal:  Negative for arthralgias and back pain.   Skin:  Negative for color change and rash.   Neurological:  Negative for seizures and syncope.   All other systems reviewed and are negative.     Past Medical History:     Past Medical History:   Diagnosis Date   • Gout    • Hypertension    • Male erectile dysfunction     [id number] RESOLVED   • Wrist fracture     LEFT      Past Surgical History:     Past Surgical History:   Procedure Laterality Date   • COLONOSCOPY N/A 6/10/2016    Procedure: COLONOSCOPY;  Surgeon: Pardeep Garsia MD;  Location: [facility] GI LAB;  Service:    • KNEE ARTHROSCOPY Left     WITH MEDIAL MENISCETOMY   • MT ARTHRS KNE SURG W/MENISCECTOMY MED/LAT W/SHVG Right 8/18/2016    Procedure: KNEE ARTHROSCOPIC PARTIAL MEDIAL MENISCECTOMY ;  Surgeon: Cali Vazquez MD;  Location: [facility];  Service: Orthopedics   • VASECTOMY      WITH REVERSAL       Family History:     Family History   Problem Relation Age of Onset   • Coronary artery disease Mother    • Gout Mother    • Hypertension Mother    • Uterine cancer Mother    • Gout Father    • Hypertension Father    • Benign prostatic hyperplasia Father    • Gout Brother    • Diabetes Daughter    • Gout Daughter    • Coronary artery disease Maternal Grandmother    • Hypertension Maternal Grandmother       Social History:     Social History     Socioeconomic History   • Marital status: /Civil Union     Spouse name: None   • Number of children: None   • Years of education: SOME COLLEGE   • Highest education level: None   Occupational History   • Occupation: [employer]   Tobacco Use   • Smoking status: Never   • Smokeless tobacco: Never   Substance and Sexual Activity   • Alcohol use: Yes     Alcohol/week: 1.0 standard drink of alcohol     Types: 1 Glasses of wine per week     Comment: 0-2 drinks per wk    • Drug use: No   • Sexual activity: Yes     Partners: Female     Birth control/protection: Surgical   Other Topics Concern   • None   Social History Narrative   • None     Social Determinants of Health     Financial Resource Strain: Not on file   Food Insecurity: Not on file   Transportation Needs: Not on file   Physical Activity: Not on file   Stress: Not on file   Social Connections: Not on file   Intimate Partner Violence: Not on file   Housing Stability: Not on file      Current Medications:     Current Outpatient Medications   Medication Sig Dispense Refill   • allopurinol (ZYLOPRIM) 300 mg tablet Take 1 tablet (300 mg total) by mouth daily 30 tablet 2   • multivitamins-fortified A-D-K (SOURCECF) solution Take 0.5 mL by mouth daily.     • neomycin-polymyxin-dexamethasone (MAXITROL) 0.35%-10,000 units/g-0.1% APPLY A SMALL AMOUNT INTO THE CONJUCTIVAL SAC INTO AFFECTED EYE 3 TIMES A DAY (Patient not taking: Reported on 1/31/2024)       No current facility-administered medications for this visit.       "Allergies:     No Known Allergies   Physical Exam:     /70   Pulse 71   Temp 98 °F (36.7 °C)   Resp 18   Ht 5' 11\" (1.803 m)   Wt 108 kg (238 lb 9.6 oz)   SpO2 97%   BMI 33.28 kg/m²     Physical Exam  Vitals and nursing note reviewed.   Constitutional:       General: He is not in acute distress.     Appearance: He is well-developed.   HENT:      Head: Normocephalic and atraumatic.   Eyes:      Conjunctiva/sclera: Conjunctivae normal.   Cardiovascular:      Rate and Rhythm: Normal rate and regular rhythm.      Heart sounds: No murmur heard.  Pulmonary:      Effort: Pulmonary effort is normal. No respiratory distress.      Breath sounds: Normal breath sounds.   Abdominal:      Palpations: Abdomen is soft.      Tenderness: There is no abdominal tenderness.   Musculoskeletal:         General: No swelling.      Cervical back: Neck supple.   Skin:     General: Skin is warm and dry.      Capillary Refill: Capillary refill takes less than 2 seconds.   Neurological:      Mental Status: He is alert.   Psychiatric:         Mood and Affect: Mood normal.          Nghia Manning MD  [facility]    "

## 2024-01-31 NOTE — PATIENT INSTRUCTIONS
Follow up with Dr. Barriga for ultrasound  Exercise at least 2x/week for one hour each time  Drinking 4 bottles of water per day    Wellness Visit for Adults   AMBULATORY CARE:   A wellness visit  is when you see your healthcare provider to get screened for health problems. Your healthcare provider will also give you advice on how to stay healthy. Write down your questions so you remember to ask them. Ask your healthcare provider how often you should have a wellness visit.  What happens at a wellness visit:  Your healthcare provider will ask about your health, and your family history of health problems. This includes high blood pressure, heart disease, and cancer. He or she will ask if you have symptoms that concern you, if you smoke, and about your mood. You may also be asked about your intake of medicines, supplements, food, and alcohol. Any of the following may be done:  Your weight  will be checked. Your height may also be checked so your body mass index (BMI) can be calculated. Your BMI shows if you are at a healthy weight.    Your blood pressure  and heart rate will be checked. Your temperature may also be checked.    Blood and urine tests  may be done. Blood tests may be done to check your cholesterol levels. Abnormal cholesterol levels increase your risk for heart disease and stroke. You may also need a blood or urine test to check for diabetes if you are at increased risk. Urine tests may be done to look for signs of an infection or kidney disease.    A physical exam  includes checking your heartbeat and lungs with a stethoscope. Your healthcare provider may also check your skin to look for sun damage.    Screening tests  may be recommended. A screening test is done to check for diseases that may not cause symptoms. The screening tests you may need depend on your age, gender, family history, and lifestyle habits. For example, colorectal screening may be recommended if you are 50 years old or  older.    Screening tests you need if you are a woman:   A Pap smear  is used to screen for cervical cancer. Pap smears are usually done every 3 to 5 years depending on your age. You may need them more often if you have had abnormal Pap smear test results in the past. Ask your healthcare provider how often you should have a Pap smear.    A mammogram  is an x-ray of your breasts to screen for breast cancer. Experts recommend mammograms every 2 years starting at age 50 years. You may need a mammogram at age 49 years or younger if you have an increased risk for breast cancer. Talk to your healthcare provider about when you should start having mammograms and how often you need them.    Vaccines you may need:   Get an influenza vaccine  every year. The influenza vaccine protects you from the flu. Several types of viruses cause the flu. The viruses change over time, so new vaccines are made each year.    Get a tetanus-diphtheria (Td) booster vaccine  every 10 years. This vaccine protects you against tetanus and diphtheria. Tetanus is a severe infection that may cause painful muscle spasms and lockjaw. Diphtheria is a severe bacterial infection that causes a thick covering in the back of your mouth and throat.    Get a human papillomavirus (HPV) vaccine  if you are female and aged 19 to 26 or male 19 to 21 and never received it. This vaccine protects you from HPV infection. HPV is the most common infection spread by sexual contact. HPV may also cause vaginal, penile, and anal cancers.    Get a pneumococcal vaccine  if you are aged 65 years or older. The pneumococcal vaccine is an injection given to protect you from pneumococcal disease. Pneumococcal disease is an infection caused by pneumococcal bacteria. The infection may cause pneumonia, meningitis, or an ear infection.    Get a shingles vaccine  if you are 60 or older, even if you have had shingles before. The shingles vaccine is an injection to protect you from the  varicella-zoster virus. This is the same virus that causes chickenpox. Shingles is a painful rash that develops in people who had chickenpox or have been exposed to the virus.    How to eat healthy:  My Plate is a model for planning healthy meals. It shows the types and amounts of foods that should go on your plate. Fruits and vegetables make up about half of your plate, and grains and protein make up the other half. A serving of dairy is included on the side of your plate. The amount of calories and serving sizes you need depends on your age, gender, weight, and height. Examples of healthy foods are listed below:  Eat a variety of vegetables  such as dark green, red, and orange vegetables. You can also include canned vegetables low in sodium (salt) and frozen vegetables without added butter or sauces.    Eat a variety of fresh fruits , canned fruit in 100% juice, frozen fruit, and dried fruit.    Include whole grains.  At least half of the grains you eat should be whole grains. Examples include whole-wheat bread, wheat pasta, brown rice, and whole-grain cereals such as oatmeal.    Eat a variety of protein foods such as seafood (fish and shellfish), lean meat, and poultry without skin (turkey and chicken). Examples of lean meats include pork leg, shoulder, or tenderloin, and beef round, sirloin, tenderloin, and extra lean ground beef. Other protein foods include eggs and egg substitutes, beans, peas, soy products, nuts, and seeds.    Choose low-fat dairy products such as skim or 1% milk or low-fat yogurt, cheese, and cottage cheese.    Limit unhealthy fats  such as butter, hard margarine, and shortening.       Exercise:  Exercise at least 30 minutes per day on most days of the week. Some examples of exercise include walking, biking, dancing, and swimming. You can also fit in more physical activity by taking the stairs instead of the elevator or parking farther away from stores. Include muscle strengthening  activities 2 days each week. Regular exercise provides many health benefits. It helps you manage your weight, and decreases your risk for type 2 diabetes, heart disease, stroke, and high blood pressure. Exercise can also help improve your mood. Ask your healthcare provider about the best exercise plan for you.       General health and safety guidelines:   Do not smoke.  Nicotine and other chemicals in cigarettes and cigars can cause lung damage. Ask your healthcare provider for information if you currently smoke and need help to quit. E-cigarettes or smokeless tobacco still contain nicotine. Talk to your healthcare provider before you use these products.    Limit alcohol.  A drink of alcohol is 12 ounces of beer, 5 ounces of wine, or 1½ ounces of liquor.    Lose weight, if needed.  Being overweight increases your risk of certain health conditions. These include heart disease, high blood pressure, type 2 diabetes, and certain types of cancer.    Protect your skin.  Do not sunbathe or use tanning beds. Use sunscreen with a SPF 15 or higher. Apply sunscreen at least 15 minutes before you go outside. Reapply sunscreen every 2 hours. Wear protective clothing, hats, and sunglasses when you are outside.    Drive safely.  Always wear your seatbelt. Make sure everyone in your car wears a seatbelt. A seatbelt can save your life if you are in an accident. Do not use your cell phone when you are driving. This could distract you and cause an accident. Pull over if you need to make a call or send a text message.    Practice safe sex.  Use latex condoms if are sexually active and have more than one partner. Your healthcare provider may recommend screening tests for sexually transmitted infections (STIs).    Wear helmets, lifejackets, and protective gear.  Always wear a helmet when you ride a bike or motorcycle, go skiing, or play sports that could cause a head injury. Wear protective equipment when you play sports. Wear a  riri when you are on a boat or doing water sports.    © Copyright [employer] 2023 Information is for End User's use only and may not be sold, redistributed or otherwise used for commercial purposes.  The above information is an  only. It is not intended as medical advice for individual conditions or treatments. Talk to your doctor, nurse or pharmacist before following any medical regimen to see if it is safe and effective for you.

## 2024-02-02 ENCOUNTER — TELEPHONE (OUTPATIENT)
Age: 59
End: 2024-02-02

## 2024-02-02 NOTE — TELEPHONE ENCOUNTER
Patient called the RX Refill Line. Message is being forwarded to the office.     Patient is requesting That his orders for CT abdomen w contrast   And US elastography/UGAP be updated in the system as they are .    Please contact patient at [phone]

## 2024-02-03 LAB
BASOPHILS # BLD AUTO: 0 X10E3/UL (ref 0–0.2)
BASOPHILS NFR BLD AUTO: 1 %
CERULOPLASMIN SERPL-MCNC: 20.6 MG/DL (ref 16–31)
EOSINOPHIL # BLD AUTO: 0.2 X10E3/UL (ref 0–0.4)
EOSINOPHIL NFR BLD AUTO: 4 %
ERYTHROCYTE [DISTWIDTH] IN BLOOD BY AUTOMATED COUNT: 12.7 % (ref 11.6–15.4)
HAV AB SER QL IA: POSITIVE
HBV CORE AB SERPL QL IA: NEGATIVE
HBV SURFACE AB SER-ACNC: <3.1 MIU/ML
HBV SURFACE AG SERPL QL IA: NEGATIVE
HCT VFR BLD AUTO: 45.1 % (ref 37.5–51)
HGB BLD-MCNC: 14.9 G/DL (ref 13–17.7)
IMM GRANULOCYTES # BLD: 0 X10E3/UL (ref 0–0.1)
IMM GRANULOCYTES NFR BLD: 0 %
INR PPP: 1 (ref 0.9–1.2)
LYMPHOCYTES # BLD AUTO: 2.2 X10E3/UL (ref 0.7–3.1)
LYMPHOCYTES NFR BLD AUTO: 44 %
MCH RBC QN AUTO: 29.9 PG (ref 26.6–33)
MCHC RBC AUTO-ENTMCNC: 33 G/DL (ref 31.5–35.7)
MCV RBC AUTO: 90 FL (ref 79–97)
MONOCYTES # BLD AUTO: 0.4 X10E3/UL (ref 0.1–0.9)
MONOCYTES NFR BLD AUTO: 8 %
NEUTROPHILS # BLD AUTO: 2.2 X10E3/UL (ref 1.4–7)
NEUTROPHILS NFR BLD AUTO: 43 %
PLATELET # BLD AUTO: 167 X10E3/UL (ref 150–450)
PROTHROMBIN TIME: 10.9 SEC (ref 9.1–12)
RBC # BLD AUTO: 4.99 X10E6/UL (ref 4.14–5.8)
WBC # BLD AUTO: 5.1 X10E3/UL (ref 3.4–10.8)

## 2024-02-05 NOTE — TELEPHONE ENCOUNTER
Spoke with patient. I informed him that his orders have not  and radiology wouldn't have scheduled the imaging if they were. Patient is scheduled for 24 and 24 to have imaging completed.

## 2024-02-07 ENCOUNTER — HOSPITAL ENCOUNTER (OUTPATIENT)
Dept: RADIOLOGY | Facility: HOSPITAL | Age: 59
Discharge: HOME | End: 2024-02-07
Attending: STUDENT IN AN ORGANIZED HEALTH CARE EDUCATION/TRAINING PROGRAM
Payer: COMMERCIAL

## 2024-02-07 DIAGNOSIS — K76.0 HEPATIC STEATOSIS: ICD-10-CM

## 2024-02-07 PROCEDURE — 76981 USE PARENCHYMA: CPT

## 2024-02-12 ENCOUNTER — TELEPHONE (OUTPATIENT)
Age: 59
End: 2024-02-12

## 2024-02-12 NOTE — TELEPHONE ENCOUNTER
Patients GI provider:  Dr. Barriga    Number to return call: [phone]    Reason for call: Pt calling to set up f/u. I made the next avail with the PA for 6/28/24 and added to the waitlist. I saw in the notes Dr Barriga wanted to see him after his bloodwork that has been completed. If a sooner appt is needed please reach out to the pt    Scheduled procedure/appointment date if applicable: Apt 6/28/24

## 2024-02-15 ENCOUNTER — TELEPHONE (OUTPATIENT)
Dept: GASTROENTEROLOGY | Facility: CLINIC | Age: 59
End: 2024-02-15

## 2024-02-15 NOTE — TELEPHONE ENCOUNTER
Patients GI provider:  Dr. Barriga    Number to return call: [phone]    Reason for call: Radha from pre encounter dept requesting a call back to discuss CT scan that was ordered for this pt. Pt is scheduled for CT tomorrow.     Scheduled procedure/appointment date if applicable: Apt 6/28

## 2024-02-15 NOTE — TELEPHONE ENCOUNTER
Spoke to patient and he is aware the case is still pending for approval and is also aware the appt will need to get rescheduled.

## 2024-02-17 LAB
ALBUMIN SERPL-MCNC: 4.1 G/DL (ref 3.8–4.9)
ALBUMIN/GLOB SERPL: 1.4 {RATIO} (ref 1.2–2.2)
ALP SERPL-CCNC: 50 IU/L (ref 44–121)
ALT SERPL-CCNC: 36 IU/L (ref 0–44)
AST SERPL-CCNC: 29 IU/L (ref 0–40)
BASOPHILS # BLD AUTO: 0 X10E3/UL (ref 0–0.2)
BASOPHILS NFR BLD AUTO: 1 %
BILIRUB SERPL-MCNC: 0.2 MG/DL (ref 0–1.2)
BUN SERPL-MCNC: 15 MG/DL (ref 6–24)
BUN/CREAT SERPL: 13 (ref 9–20)
CALCIUM SERPL-MCNC: 9.1 MG/DL (ref 8.7–10.2)
CHLORIDE SERPL-SCNC: 102 MMOL/L (ref 96–106)
CHOLEST SERPL-MCNC: 152 MG/DL (ref 100–199)
CHOLEST/HDLC SERPL: 2.9 RATIO (ref 0–5)
CO2 SERPL-SCNC: 23 MMOL/L (ref 20–29)
CREAT SERPL-MCNC: 1.13 MG/DL (ref 0.76–1.27)
EGFR: 76 ML/MIN/1.73
EOSINOPHIL # BLD AUTO: 0.2 X10E3/UL (ref 0–0.4)
EOSINOPHIL NFR BLD AUTO: 4 %
ERYTHROCYTE [DISTWIDTH] IN BLOOD BY AUTOMATED COUNT: 12.4 % (ref 11.6–15.4)
EST. AVERAGE GLUCOSE BLD GHB EST-MCNC: 126 MG/DL
GLOBULIN SER-MCNC: 2.9 G/DL (ref 1.5–4.5)
GLUCOSE SERPL-MCNC: 100 MG/DL (ref 70–99)
HBA1C MFR BLD: 6 % (ref 4.8–5.6)
HCT VFR BLD AUTO: 46.5 % (ref 37.5–51)
HDLC SERPL-MCNC: 53 MG/DL
HGB BLD-MCNC: 15 G/DL (ref 13–17.7)
IMM GRANULOCYTES # BLD: 0 X10E3/UL (ref 0–0.1)
IMM GRANULOCYTES NFR BLD: 0 %
LDLC SERPL CALC-MCNC: 77 MG/DL (ref 0–99)
LYMPHOCYTES # BLD AUTO: 2.2 X10E3/UL (ref 0.7–3.1)
LYMPHOCYTES NFR BLD AUTO: 43 %
MCH RBC QN AUTO: 29.8 PG (ref 26.6–33)
MCHC RBC AUTO-ENTMCNC: 32.3 G/DL (ref 31.5–35.7)
MCV RBC AUTO: 92 FL (ref 79–97)
MONOCYTES # BLD AUTO: 0.4 X10E3/UL (ref 0.1–0.9)
MONOCYTES NFR BLD AUTO: 9 %
NEUTROPHILS # BLD AUTO: 2.1 X10E3/UL (ref 1.4–7)
NEUTROPHILS NFR BLD AUTO: 43 %
PLATELET # BLD AUTO: 175 X10E3/UL (ref 150–450)
POTASSIUM SERPL-SCNC: 4.2 MMOL/L (ref 3.5–5.2)
PROT SERPL-MCNC: 7 G/DL (ref 6–8.5)
RBC # BLD AUTO: 5.04 X10E6/UL (ref 4.14–5.8)
SL AMB VLDL CHOLESTEROL CALC: 22 MG/DL (ref 5–40)
SODIUM SERPL-SCNC: 140 MMOL/L (ref 134–144)
T4 FREE SERPL DIALY-MCNC: 0.92 NG/DL
TRIGL SERPL-MCNC: 122 MG/DL (ref 0–149)
TSH SERPL-ACNC: 2.1 UU/ML
URATE SERPL-MCNC: 6.8 MG/DL (ref 3.8–8.4)
WBC # BLD AUTO: 4.9 X10E3/UL (ref 3.4–10.8)

## 2024-02-21 LAB
ALBUMIN SERPL-MCNC: 4.2 G/DL (ref 3.8–4.9)
ALBUMIN/GLOB SERPL: 1.8 {RATIO} (ref 1.2–2.2)
ALP SERPL-CCNC: 49 IU/L (ref 44–121)
ALT SERPL-CCNC: 36 IU/L (ref 0–44)
AST SERPL-CCNC: 26 IU/L (ref 0–40)
BASOPHILS # BLD AUTO: 0 X10E3/UL (ref 0–0.2)
BASOPHILS NFR BLD AUTO: 1 %
BILIRUB SERPL-MCNC: 0.4 MG/DL (ref 0–1.2)
BUN SERPL-MCNC: 17 MG/DL (ref 6–24)
BUN/CREAT SERPL: 16 (ref 9–20)
CALCIUM SERPL-MCNC: 8.8 MG/DL (ref 8.7–10.2)
CHLORIDE SERPL-SCNC: 103 MMOL/L (ref 96–106)
CHOLEST SERPL-MCNC: 171 MG/DL (ref 100–199)
CHOLEST/HDLC SERPL: 3.2 RATIO (ref 0–5)
CO2 SERPL-SCNC: 20 MMOL/L (ref 20–29)
CREAT SERPL-MCNC: 1.07 MG/DL (ref 0.76–1.27)
EGFR: 80 ML/MIN/1.73
EOSINOPHIL # BLD AUTO: 0.1 X10E3/UL (ref 0–0.4)
EOSINOPHIL NFR BLD AUTO: 2 %
ERYTHROCYTE [DISTWIDTH] IN BLOOD BY AUTOMATED COUNT: 12.7 % (ref 11.6–15.4)
EST. AVERAGE GLUCOSE BLD GHB EST-MCNC: 134 MG/DL
GLOBULIN SER-MCNC: 2.3 G/DL (ref 1.5–4.5)
GLUCOSE SERPL-MCNC: 93 MG/DL (ref 70–99)
HBA1C MFR BLD: 6.3 % (ref 4.8–5.6)
HCT VFR BLD AUTO: 44.7 % (ref 37.5–51)
HDLC SERPL-MCNC: 54 MG/DL
HGB BLD-MCNC: 14.8 G/DL (ref 13–17.7)
IMM GRANULOCYTES # BLD: 0 X10E3/UL (ref 0–0.1)
IMM GRANULOCYTES NFR BLD: 0 %
LDLC SERPL CALC-MCNC: 100 MG/DL (ref 0–99)
LYMPHOCYTES # BLD AUTO: 2.2 X10E3/UL (ref 0.7–3.1)
LYMPHOCYTES NFR BLD AUTO: 37 %
MCH RBC QN AUTO: 30.3 PG (ref 26.6–33)
MCHC RBC AUTO-ENTMCNC: 33.1 G/DL (ref 31.5–35.7)
MCV RBC AUTO: 92 FL (ref 79–97)
MONOCYTES # BLD AUTO: 0.4 X10E3/UL (ref 0.1–0.9)
MONOCYTES NFR BLD AUTO: 6 %
NEUTROPHILS # BLD AUTO: 3.2 X10E3/UL (ref 1.4–7)
NEUTROPHILS NFR BLD AUTO: 54 %
PLATELET # BLD AUTO: 148 X10E3/UL (ref 150–450)
POTASSIUM SERPL-SCNC: 4.3 MMOL/L (ref 3.5–5.2)
PROT SERPL-MCNC: 6.5 G/DL (ref 6–8.5)
RBC # BLD AUTO: 4.88 X10E6/UL (ref 4.14–5.8)
SL AMB VLDL CHOLESTEROL CALC: 17 MG/DL (ref 5–40)
SODIUM SERPL-SCNC: 139 MMOL/L (ref 134–144)
T4 FREE SERPL DIALY-MCNC: 1.2 NG/DL
TRIGL SERPL-MCNC: 91 MG/DL (ref 0–149)
TSH SERPL-ACNC: 4.2 UU/ML
URATE SERPL-MCNC: 7.1 MG/DL (ref 3.8–8.4)
WBC # BLD AUTO: 5.9 X10E3/UL (ref 3.4–10.8)

## 2024-02-28 ENCOUNTER — RA CDI HCC (OUTPATIENT)
Dept: OTHER | Facility: HOSPITAL | Age: 59
End: 2024-02-28

## 2024-02-28 NOTE — PROGRESS NOTES
HCC coding opportunities       Chart reviewed, no opportunity found: CHART REVIEWED, NO OPPORTUNITY FOUND        Patients Insurance        Commercial Insurance: [employer]

## 2024-02-29 ENCOUNTER — TELEPHONE (OUTPATIENT)
Dept: FAMILY MEDICINE CLINIC | Facility: OTHER | Age: 59
End: 2024-02-29

## 2024-02-29 NOTE — TELEPHONE ENCOUNTER
LEXX ~ Pt called and rescheduled his appt from 3/6/2024 said he spoke with his pcp 2/28/2024 and they discussed pushing appointment out until may to see how things are for patient cause he is going to start working out    Pt is now scheduled for 5/29/2024

## 2024-03-04 ENCOUNTER — OFFICE VISIT (OUTPATIENT)
Dept: UROLOGY | Facility: CLINIC | Age: 59
End: 2024-03-04
Payer: COMMERCIAL

## 2024-03-04 VITALS
DIASTOLIC BLOOD PRESSURE: 80 MMHG | BODY MASS INDEX: 32.9 KG/M2 | HEIGHT: 71 IN | OXYGEN SATURATION: 96 % | SYSTOLIC BLOOD PRESSURE: 120 MMHG | WEIGHT: 235 LBS | HEART RATE: 82 BPM

## 2024-03-04 DIAGNOSIS — N52.9 ERECTILE DYSFUNCTION, UNSPECIFIED ERECTILE DYSFUNCTION TYPE: Primary | ICD-10-CM

## 2024-03-04 DIAGNOSIS — Z12.5 PROSTATE CANCER SCREENING: ICD-10-CM

## 2024-03-04 PROCEDURE — 99204 OFFICE O/P NEW MOD 45 MIN: CPT

## 2024-03-04 RX ORDER — TADALAFIL 10 MG/1
10 TABLET ORAL DAILY PRN
Qty: 30 TABLET | Refills: 0 | Status: SHIPPED | OUTPATIENT
Start: 2024-03-04

## 2024-03-04 NOTE — PROGRESS NOTES
Office Visit- Urology  Yousuf Anna 1965 MRN: [id number]      Assessment/Discussion/Plan    59 y.o. male managed by     BPH with urinary tract symptoms  -Had discussion about pharmacologic options.  Patient defers pharmacotherapy at today's appointment  -Reassess at follow-up in 4 to 6 weeks    2.  Prostate cancer screening   -last PSA was 0.3 measured in December 2018  -Patient defers CLARITA at today's appointment.  Obtain at follow-up  -PSA ordered  -Follow-up in 4 to 6 weeks    3.ED   -Trial of Cialis 10 mg.  Patient informed he can take as much is 20 mg at a time.  Advised him on administration and possible side effects  -Reassess at follow-up   -patient with low libido and fatigue.  Obtainment of testosterone between 7-9 AM  -Follow-up in 4 to 6 weeks        Chief Complaint:   Yousuf is a 59 y.o. male presenting to the office for an initial evaluation regarding BPH        Subjective    Patient is a 59-year-old male with a previous urologic history of routine prostate cancer screening and erectile dysfunction who presents to the office today to establish care and for evaluation of BPH/DARCIE.  He states that he previously saw Colin Edwards and Dr. Fajardo for routine prostate cancer screening.  He has previously trialed Cialis for ED and found it to be effective.  He states that he is having some bothersome urinary tract symptoms including frequency, nocturia.  He does not find his symptoms to be bothersome enough to warrant pharmacotherapy at this point in time.  He last had a PSA that was measured at 0.3 in December 2018.  He denies any known family history of prostate cancer.  He states that his uncle had BPH.  He is interested in pharmacotherapy for erectile dysfunction as he has difficulty maintaining erections.  He does report low libido and fatigue.  Had previous testosterone testing which was borderline back in 2016        AUA SYMPTOM SCORE      Flowsheet Row Most Recent Value   AUA SYMPTOM SCORE     How often have you had a sensation of not emptying your bladder completely after you finished urinating? 2   How often have you had to urinate again less than two hours after you finished urinating? 3   How often have you found you stopped and started again several times when you urinate? 3   How often have you found it difficult to postpone urination? 2   How often have you had a weak urinary stream? 4   How often have you had to push or strain to begin urination? 3   How many times did you most typically get up to urinate from the time you went to bed at night until the time you got up in the morning? 4   Quality of Life: If you were to spend the rest of your life with your urinary condition just the way it is now, how would you feel about that? 2   AUA SYMPTOM SCORE 21            ROS:   Review of Systems   Constitutional: Negative.  Negative for chills, fatigue and fever.   HENT: Negative.     Respiratory:  Negative for shortness of breath.    Cardiovascular:  Negative for chest pain.   Gastrointestinal: Negative.  Negative for abdominal pain.   Endocrine: Negative.    Musculoskeletal: Negative.    Skin: Negative.    Neurological: Negative.  Negative for dizziness and light-headedness.   Hematological: Negative.    Psychiatric/Behavioral: Negative.           Past Medical History  Past Medical History:   Diagnosis Date    Gout     Hypertension     Male erectile dysfunction     [id number] RESOLVED    Wrist fracture     LEFT       Past Surgical History  Past Surgical History:   Procedure Laterality Date    COLONOSCOPY N/A 6/10/2016    Procedure: COLONOSCOPY;  Surgeon: Pardeep Garsia MD;  Location: [facility] GI LAB;  Service:     KNEE ARTHROSCOPY Left     WITH MEDIAL MENISCETOMY    AL ARTHRS KNE SURG W/MENISCECTOMY MED/LAT W/SHVG Right 8/18/2016    Procedure: KNEE ARTHROSCOPIC PARTIAL MEDIAL MENISCECTOMY ;  Surgeon: Cali Vazquez MD;  Location: [facility];  Service: Orthopedics    VASECTOMY      WITH REVERSAL       Past  Family History  Family History   Problem Relation Age of Onset    Coronary artery disease Mother     Gout Mother     Hypertension Mother     Uterine cancer Mother     Gout Father     Hypertension Father     Benign prostatic hyperplasia Father     Gout Brother     Diabetes Daughter     Gout Daughter     Coronary artery disease Maternal Grandmother     Hypertension Maternal Grandmother        Past Social history  Social History     Socioeconomic History    Marital status: /Civil Union     Spouse name: Not on file    Number of children: Not on file    Years of education: SOME COLLEGE    Highest education level: Not on file   Occupational History    Occupation: [employer]   Tobacco Use    Smoking status: Never    Smokeless tobacco: Never   Substance and Sexual Activity    Alcohol use: Yes     Alcohol/week: 1.0 standard drink of alcohol     Types: 1 Glasses of wine per week     Comment: 0-2 drinks per wk     Drug use: No    Sexual activity: Yes     Partners: Female     Birth control/protection: Surgical   Other Topics Concern    Not on file   Social History Narrative    Not on file     Social Determinants of Health     Financial Resource Strain: Not on file   Food Insecurity: Not on file   Transportation Needs: Not on file   Physical Activity: Not on file   Stress: Not on file   Social Connections: Not on file   Intimate Partner Violence: Not on file   Housing Stability: Not on file       Current Medications  Current Outpatient Medications   Medication Sig Dispense Refill    allopurinol (ZYLOPRIM) 300 mg tablet Take 1 tablet (300 mg total) by mouth daily 30 tablet 2    multivitamins-fortified A-D-K (SOURCECF) solution Take 0.5 mL by mouth daily.      neomycin-polymyxin-dexamethasone (MAXITROL) 0.35%-10,000 units/g-0.1% APPLY A SMALL AMOUNT INTO THE CONJUCTIVAL SAC INTO AFFECTED EYE 3 TIMES A DAY (Patient not taking: Reported on 1/31/2024)       No current facility-administered medications for this visit.  "      Allergies  No Known Allergies    OBJECTIVE    Vitals   Vitals:    03/04/24 1017   BP: 120/80   BP Location: Left arm   Patient Position: Sitting   Cuff Size: Standard   Pulse: 82   SpO2: 96%   Weight: 107 kg (235 lb)   Height: 5' 11\" (1.803 m)       PVR:    Physical Exam  Constitutional:       General: He is not in acute distress.     Appearance: Normal appearance. He is normal weight. He is not ill-appearing or toxic-appearing.   HENT:      Head: Normocephalic and atraumatic.   Eyes:      Conjunctiva/sclera: Conjunctivae normal.   Cardiovascular:      Rate and Rhythm: Normal rate.   Pulmonary:      Effort: Pulmonary effort is normal. No respiratory distress.   Skin:     General: Skin is warm and dry.   Neurological:      General: No focal deficit present.      Mental Status: He is alert and oriented to person, place, and time.      Cranial Nerves: No cranial nerve deficit.   Psychiatric:         Mood and Affect: Mood normal.         Behavior: Behavior normal.         Thought Content: Thought content normal.          Labs:     Lab Results   Component Value Date    PSA 0.3 12/21/2018     Lab Results   Component Value Date    CREATININE 1.07 02/12/2024      Lab Results   Component Value Date    HGBA1C 6.3 (H) 02/12/2024     Lab Results   Component Value Date    CALCIUM 9.0 02/15/2017     02/15/2017    K 4.3 02/12/2024    CO2 20 02/12/2024     02/12/2024    BUN 17 02/12/2024    CREATININE 1.07 02/12/2024       I have personally reviewed all pertinent lab results and reviewed with patient    Imaging       Oscar Robledo PA-C  Date: 3/4/2024 Time: 10:27 AM  [facility]    This note was written using fluency dictation software. Please excuse any resulting minor grammatical errors.      "

## 2024-04-15 ENCOUNTER — OFFICE VISIT (OUTPATIENT)
Dept: DENTISTRY | Facility: CLINIC | Age: 59
End: 2024-04-15

## 2024-04-15 VITALS — DIASTOLIC BLOOD PRESSURE: 77 MMHG | HEART RATE: 76 BPM | SYSTOLIC BLOOD PRESSURE: 152 MMHG

## 2024-04-15 DIAGNOSIS — K05.6 PERIODONTAL DISEASE: Primary | ICD-10-CM

## 2024-04-15 PROCEDURE — D4910 PERIODONTAL MAINTENANCE: HCPCS

## 2024-04-15 NOTE — DENTAL PROCEDURE DETAILS
4 MTH PERIO MAINTENANCE     REVIEWED MED HX: meds, allergies, health changes reviewed in EPIC  CHIEF CONCERN:  none   PAIN SCALE:  0  ASA CLASS:  II  PLAQUE:  mild   CALCULUS: light   BLEEDING: light   STAIN : light  ORAL HYGIENE:  good    PERIO: 2A    Hand scaled, polished and flossed. Used Cavitron.     Oral Hygiene Instruction:  recommended brushing 2 x daily for 2 minutes MIN, recommended flossing daily, reviewed dietary precautions.    Visual and Tactile Intraoral/ Extraoral evaluation: Oral and Oropharyngeal cancer evaluation. No findings     REFERRALS: no referrals provided      Next Recall: 3 month perio maintenance/periodic exam and 4 BWX      Last  FMX : 5/25/2023

## 2024-09-11 DIAGNOSIS — M1A.9XX0 CHRONIC GOUT WITHOUT TOPHUS, UNSPECIFIED CAUSE, UNSPECIFIED SITE: ICD-10-CM

## 2024-09-11 DIAGNOSIS — E79.0 HYPERURICEMIA: ICD-10-CM

## 2024-09-11 NOTE — TELEPHONE ENCOUNTER
Reason for call:   [x] Refill   [] Prior Auth  [] Other:     Office:   [x] PCP/Provider - FP LASHA Manning MD   [] Specialty/Provider -     Medication:     allopurinol (ZYLOPRIM) 300 mg tablet         Take 1 tablet (300 mg total) by mouth daily       Pharmacy: [facility]/pharmacy #[id number] - [place] - [address] [place] [address].     Does the patient have enough for 3 days?   [x] Yes   [] No - Send as HP to POD

## 2024-09-13 RX ORDER — ALLOPURINOL 300 MG/1
300 TABLET ORAL DAILY
Qty: 30 TABLET | Refills: 0 | Status: SHIPPED | OUTPATIENT
Start: 2024-09-13

## 2024-10-17 DIAGNOSIS — E79.0 HYPERURICEMIA: ICD-10-CM

## 2024-10-17 DIAGNOSIS — M1A.9XX0 CHRONIC GOUT WITHOUT TOPHUS, UNSPECIFIED CAUSE, UNSPECIFIED SITE: ICD-10-CM

## 2024-10-17 RX ORDER — ALLOPURINOL 300 MG/1
300 TABLET ORAL DAILY
Qty: 90 TABLET | Refills: 1 | Status: SHIPPED | OUTPATIENT
Start: 2024-10-17

## 2024-11-22 ENCOUNTER — OFFICE VISIT (OUTPATIENT)
Age: 59
End: 2024-11-22
Payer: COMMERCIAL

## 2024-11-22 VITALS
TEMPERATURE: 97.3 F | HEART RATE: 85 BPM | SYSTOLIC BLOOD PRESSURE: 144 MMHG | DIASTOLIC BLOOD PRESSURE: 70 MMHG | WEIGHT: 232.2 LBS | OXYGEN SATURATION: 96 % | BODY MASS INDEX: 32.39 KG/M2

## 2024-11-22 DIAGNOSIS — K76.0 HEPATIC STEATOSIS: ICD-10-CM

## 2024-11-22 DIAGNOSIS — R03.0 BORDERLINE HYPERTENSION: Primary | ICD-10-CM

## 2024-11-22 PROCEDURE — 99213 OFFICE O/P EST LOW 20 MIN: CPT

## 2024-11-22 NOTE — PROGRESS NOTES
Name: Yousuf Anna      : 1965      MRN: [id number]  Encounter Provider: Nghia Manning MD  Encounter Date: 2024   Encounter department: [facility]  :  Assessment & Plan  Borderline hypertension  History of borderline high blood pressure.  Would not like to start antihypertensive medications at this time.  Will initiate dietary changes consisting of more fruits and vegetables as well as lean proteins into daily diet.  Will also initiate exercise routine consisting of cardiovascular as well as weight training for the recommended 150 minutes per week. Will follow up in 3 months for annual physical.        Hepatic steatosis  History of hepatic steatosis.  Pt endorsed drinking 14 drinks per week.  Educated patient on reducing alcohol intake for better liver health.  Patient admitted and will cut back on alcohol going forward.  Reviewed recent ultrasound results.  Will follow up in 3 months to track progress.               History of Present Illness     Wanted to discuss full body scan.  States he had a family history of cancer as mother had uterine cancer.  Has history of fatty liver disease which has been stable since his last ultrasound.  Admits he has not been adhering to a great diet.  Denies any symptoms related to cancers such as night sweats, unintentional weight loss, abdominal fullness or hematuria or bloody stools.  Pt was discussing blood pressure readings and ways to improve lifestyle changes.  Other than what is noted above, he has no other symptoms or complaints at this time.        Review of Systems   Constitutional:  Negative for chills and fever.   HENT:  Negative for ear pain and sore throat.    Eyes:  Negative for pain and visual disturbance.   Respiratory:  Negative for cough and shortness of breath.    Cardiovascular:  Negative for chest pain and palpitations.   Gastrointestinal:  Negative for abdominal pain and vomiting.   Genitourinary:  Negative for  dysuria and hematuria.   Musculoskeletal:  Negative for arthralgias and back pain.   Skin:  Negative for color change and rash.   Neurological:  Negative for seizures and syncope.   All other systems reviewed and are negative.      Nutrition Assessment and Intervention:     Nutrition patient handout reviewed with patient      Physical Activity Assessment and Intervention:    Physical activity online resources/apps provided to patient      Emotional and Mental Well-being, Sleep, Connectedness Assessment and Intervention:    Stress management, meditation, yoga, or sleep online resources/apps provided to patient      Pertinent Medical History   Past Medical History:   Diagnosis Date    Gout     Hypertension     Male erectile dysfunction     [id number] RESOLVED    Wrist fracture     LEFT      Medical History Reviewed by provider this encounter:     .  Current Outpatient Medications on File Prior to Visit   Medication Sig Dispense Refill    allopurinol (ZYLOPRIM) 300 mg tablet TAKE 1 TABLET BY MOUTH EVERY DAY 90 tablet 1    multivitamins-fortified A-D-K (SOURCECF) solution Take 0.5 mL by mouth daily.      tadalafil (CIALIS) 10 MG tablet Take 1 tablet (10 mg total) by mouth daily as needed for erectile dysfunction (Patient not taking: Reported on 11/22/2024) 30 tablet 0     No current facility-administered medications on file prior to visit.      Social History     Tobacco Use    Smoking status: Never    Smokeless tobacco: Never   Vaping Use    Vaping status: Never Used   Substance and Sexual Activity    Alcohol use: Yes     Alcohol/week: 1.0 standard drink of alcohol     Types: 1 Glasses of wine per week     Comment: 0-2 drinks per wk     Drug use: No    Sexual activity: Yes     Partners: Female     Birth control/protection: Surgical        Objective   /70   Pulse 85   Temp (!) 97.3 °F (36.3 °C)   Wt 105 kg (232 lb 3.2 oz)   SpO2 96%   BMI 32.39 kg/m²      Physical Exam  Vitals and nursing note reviewed.    Constitutional:       General: He is not in acute distress.     Appearance: He is well-developed.   HENT:      Head: Normocephalic and atraumatic.   Eyes:      Conjunctiva/sclera: Conjunctivae normal.   Cardiovascular:      Rate and Rhythm: Normal rate and regular rhythm.      Heart sounds: No murmur heard.  Pulmonary:      Effort: Pulmonary effort is normal. No respiratory distress.      Breath sounds: Normal breath sounds.   Abdominal:      Palpations: Abdomen is soft.      Tenderness: There is no abdominal tenderness.   Musculoskeletal:         General: No swelling.      Cervical back: Neck supple.   Skin:     General: Skin is warm and dry.      Capillary Refill: Capillary refill takes less than 2 seconds.   Neurological:      Mental Status: He is alert.   Psychiatric:         Mood and Affect: Mood normal.

## 2024-11-22 NOTE — ASSESSMENT & PLAN NOTE
History of hepatic steatosis.  Pt endorsed drinking 14 drinks per week.  Educated patient on reducing alcohol intake for better liver health.  Patient admitted and will cut back on alcohol going forward.  Reviewed recent ultrasound results.  Will follow up in 3 months to track progress.

## 2024-11-22 NOTE — ASSESSMENT & PLAN NOTE
History of borderline high blood pressure.  Would not like to start antihypertensive medications at this time.  Will initiate dietary changes consisting of more fruits and vegetables as well as lean proteins into daily diet.  Will also initiate exercise routine consisting of cardiovascular as well as weight training for the recommended 150 minutes per week. Will follow up in 3 months for annual physical.

## 2024-11-25 DIAGNOSIS — Z00.6 ENCOUNTER FOR EXAMINATION FOR NORMAL COMPARISON OR CONTROL IN CLINICAL RESEARCH PROGRAM: ICD-10-CM

## 2025-01-14 ENCOUNTER — APPOINTMENT (OUTPATIENT)
Dept: LAB | Facility: CLINIC | Age: 60
End: 2025-01-14

## 2025-01-14 DIAGNOSIS — Z00.6 ENCOUNTER FOR EXAMINATION FOR NORMAL COMPARISON OR CONTROL IN CLINICAL RESEARCH PROGRAM: ICD-10-CM

## 2025-01-14 PROCEDURE — 36415 COLL VENOUS BLD VENIPUNCTURE: CPT

## 2025-01-20 ENCOUNTER — RA CDI HCC (OUTPATIENT)
Dept: OTHER | Facility: HOSPITAL | Age: 60
End: 2025-01-20

## 2025-01-24 LAB
APOB+LDLR+PCSK9 GENE MUT ANL BLD/T: NOT DETECTED
BRCA1+BRCA2 DEL+DUP + FULL MUT ANL BLD/T: NOT DETECTED
MLH1+MSH2+MSH6+PMS2 GN DEL+DUP+FUL M: NOT DETECTED

## 2025-02-03 ENCOUNTER — OFFICE VISIT (OUTPATIENT)
Age: 60
End: 2025-02-03
Payer: COMMERCIAL

## 2025-02-03 VITALS
HEIGHT: 71 IN | OXYGEN SATURATION: 97 % | BODY MASS INDEX: 31.22 KG/M2 | SYSTOLIC BLOOD PRESSURE: 140 MMHG | WEIGHT: 223 LBS | DIASTOLIC BLOOD PRESSURE: 78 MMHG | HEART RATE: 71 BPM | TEMPERATURE: 97.8 F

## 2025-02-03 DIAGNOSIS — E79.0 HYPERURICEMIA: ICD-10-CM

## 2025-02-03 DIAGNOSIS — R07.89 OTHER CHEST PAIN: Primary | ICD-10-CM

## 2025-02-03 DIAGNOSIS — M1A.9XX0 CHRONIC GOUT WITHOUT TOPHUS, UNSPECIFIED CAUSE, UNSPECIFIED SITE: ICD-10-CM

## 2025-02-03 DIAGNOSIS — Z00.00 WELCOME TO MEDICARE PREVENTIVE VISIT: ICD-10-CM

## 2025-02-03 DIAGNOSIS — Z13.6 SCREENING FOR CARDIOVASCULAR CONDITION: ICD-10-CM

## 2025-02-03 PROCEDURE — 99396 PREV VISIT EST AGE 40-64: CPT

## 2025-02-03 RX ORDER — ALLOPURINOL 300 MG/1
300 TABLET ORAL DAILY
Qty: 90 TABLET | Refills: 1 | Status: SHIPPED | OUTPATIENT
Start: 2025-02-03

## 2025-02-03 NOTE — PATIENT INSTRUCTIONS
Medicare Preventive Visit Patient Instructions  Thank you for completing your Welcome to Medicare Visit or Medicare Annual Wellness Visit today. Your next wellness visit will be due in one year (2/4/2026).  The screening/preventive services that you may require over the next 5-10 years are detailed below. Some tests may not apply to you based off risk factors and/or age. Screening tests ordered at today's visit but not completed yet may show as past due. Also, please note that scanned in results may not display below.  Preventive Screenings:  Service Recommendations Previous Testing/Comments   Colorectal Cancer Screening  Colonoscopy    Fecal Occult Blood Test (FOBT)/Fecal Immunochemical Test (FIT)  Fecal DNA/Cologuard Test  Flexible Sigmoidoscopy Age: 45-75 years old   Colonoscopy: every 10 years (May be performed more frequently if at higher risk)  OR  FOBT/FIT: every 1 year  OR  Cologuard: every 3 years  OR  Sigmoidoscopy: every 5 years  Screening may be recommended earlier than age 45 if at higher risk for colorectal cancer. Also, an individualized decision between you and your healthcare provider will decide whether screening between the ages of 76-85 would be appropriate. Colonoscopy: 06/10/2016  FOBT/FIT: Not on file  Cologuard: Not on file  Sigmoidoscopy: Not on file          Prostate Cancer Screening Individualized decision between patient and health care provider in men between ages of 55-69   Medicare will cover every 12 months beginning on the day after your 50th birthday PSA: No results in last 5 years           Hepatitis C Screening Once for adults born between 1945 and 1965  More frequently in patients at high risk for Hepatitis C Hep C Antibody: 05/27/2021        Diabetes Screening 1-2 times per year if you're at risk for diabetes or have pre-diabetes Fasting glucose: No results in last 5 years (No results in last 5 years)  A1C: 6.3 % (2/12/2024)      Cholesterol Screening Once every 5 years if you  don't have a lipid disorder. May order more often based on risk factors. Lipid panel: 02/12/2024         Other Preventive Screenings Covered by Medicare:  Abdominal Aortic Aneurysm (AAA) Screening: covered once if your at risk. You're considered to be at risk if you have a family history of AAA or a male between the age of 65-75 who smoking at least 100 cigarettes in your lifetime.  Lung Cancer Screening: covers low dose CT scan once per year if you meet all of the following conditions: (1) Age 55-77; (2) No signs or symptoms of lung cancer; (3) Current smoker or have quit smoking within the last 15 years; (4) You have a tobacco smoking history of at least 20 pack years (packs per day x number of years you smoked); (5) You get a written order from a healthcare provider.  Glaucoma Screening: covered annually if you're considered high risk: (1) You have diabetes OR (2) Family history of glaucoma OR (3)  aged 50 and older OR (4)  American aged 65 and older  Osteoporosis Screening: covered every 2 years if you meet one of the following conditions: (1) Have a vertebral abnormality; (2) On glucocorticoid therapy for more than 3 months; (3) Have primary hyperparathyroidism; (4) On osteoporosis medications and need to assess response to drug therapy.  HIV Screening: covered annually if you're between the age of 15-65. Also covered annually if you are younger than 15 and older than 65 with risk factors for HIV infection. For pregnant patients, it is covered up to 3 times per pregnancy.    Immunizations:  Immunization Recommendations   Influenza Vaccine Annual influenza vaccination during flu season is recommended for all persons aged >= 6 months who do not have contraindications   Pneumococcal Vaccine   * Pneumococcal conjugate vaccine = PCV13 (Prevnar 13), PCV15 (Vaxneuvance), PCV20 (Prevnar 20)  * Pneumococcal polysaccharide vaccine = PPSV23 (Pneumovax) Adults 19-65 yo with certain risk factors or  if 65+ yo  If never received any pneumonia vaccine: recommend Prevnar 20 (PCV20)  Give PCV20 if previously received 1 dose of PCV13 or PPSV23   Hepatitis B Vaccine 3 dose series if at intermediate or high risk (ex: diabetes, end stage renal disease, liver disease)   Respiratory syncytial virus (RSV) Vaccine - COVERED BY MEDICARE PART D  * RSVPreF3 (Arexvy) CDC recommends that adults 60 years of age and older may receive a single dose of RSV vaccine using shared clinical decision-making (SCDM)   Tetanus (Td) Vaccine - COST NOT COVERED BY MEDICARE PART B Following completion of primary series, a booster dose should be given every 10 years to maintain immunity against tetanus. Td may also be given as tetanus wound prophylaxis.   Tdap Vaccine - COST NOT COVERED BY MEDICARE PART B Recommended at least once for all adults. For pregnant patients, recommended with each pregnancy.   Shingles Vaccine (Shingrix) - COST NOT COVERED BY MEDICARE PART B  2 shot series recommended in those 19 years and older who have or will have weakened immune systems or those 50 years and older     Health Maintenance Due:      Topic Date Due   • Colorectal Cancer Screening  06/10/2026   • HIV Screening  Completed   • Hepatitis C Screening  Completed     Immunizations Due:      Topic Date Due   • Influenza Vaccine (1) 09/01/2024   • COVID-19 Vaccine (4 - 2024-25 season) 09/01/2024     Advance Directives   What are advance directives?  Advance directives are legal documents that state your wishes and plans for medical care. These plans are made ahead of time in case you lose your ability to make decisions for yourself. Advance directives can apply to any medical decision, such as the treatments you want, and if you want to donate organs.   What are the types of advance directives?  There are many types of advance directives, and each state has rules about how to use them. You may choose a combination of any of the following:  Living will:  This is  a written record of the treatment you want. You can also choose which treatments you do not want, which to limit, and which to stop at a certain time. This includes surgery, medicine, IV fluid, and tube feedings.   Durable power of  for healthcare (DPAHC):  This is a written record that states who you want to make healthcare choices for you when you are unable to make them for yourself. This person, called a proxy, is usually a family member or a friend. You may choose more than 1 proxy.  Do not resuscitate (DNR) order:  A DNR order is used in case your heart stops beating or you stop breathing. It is a request not to have certain forms of treatment, such as CPR. A DNR order may be included in other types of advance directives.  Medical directive:  This covers the care that you want if you are in a coma, near death, or unable to make decisions for yourself. You can list the treatments you want for each condition. Treatment may include pain medicine, surgery, blood transfusions, dialysis, IV or tube feedings, and a ventilator (breathing machine).  Values history:  This document has questions about your views, beliefs, and how you feel and think about life. This information can help others choose the care that you would choose.  Why are advance directives important?  An advance directive helps you control your care. Although spoken wishes may be used, it is better to have your wishes written down. Spoken wishes can be misunderstood, or not followed. Treatments may be given even if you do not want them. An advance directive may make it easier for your family to make difficult choices about your care.   Weight Management   Why it is important to manage your weight:  Being overweight increases your risk of health conditions such as heart disease, high blood pressure, type 2 diabetes, and certain types of cancer. It can also increase your risk for osteoarthritis, sleep apnea, and other respiratory problems. Aim  for a slow, steady weight loss. Even a small amount of weight loss can lower your risk of health problems.  How to lose weight safely:  A safe and healthy way to lose weight is to eat fewer calories and get regular exercise. You can lose up about 1 pound a week by decreasing the number of calories you eat by 500 calories each day.   Healthy meal plan for weight management:  A healthy meal plan includes a variety of foods, contains fewer calories, and helps you stay healthy. A healthy meal plan includes the following:  Eat whole-grain foods more often.  A healthy meal plan should contain fiber. Fiber is the part of grains, fruits, and vegetables that is not broken down by your body. Whole-grain foods are healthy and provide extra fiber in your diet. Some examples of whole-grain foods are whole-wheat breads and pastas, oatmeal, brown rice, and bulgur.  Eat a variety of vegetables every day.  Include dark, leafy greens such as spinach, kale, ray greens, and mustard greens. Eat yellow and orange vegetables such as carrots, sweet potatoes, and winter squash.   Eat a variety of fruits every day.  Choose fresh or canned fruit (canned in its own juice or light syrup) instead of juice. Fruit juice has very little or no fiber.  Eat low-fat dairy foods.  Drink fat-free (skim) milk or 1% milk. Eat fat-free yogurt and low-fat cottage cheese. Try low-fat cheeses such as mozzarella and other reduced-fat cheeses.  Choose meat and other protein foods that are low in fat.  Choose beans or other legumes such as split peas or lentils. Choose fish, skinless poultry (chicken or turkey), or lean cuts of red meat (beef or pork). Before you cook meat or poultry, cut off any visible fat.   Use less fat and oil.  Try baking foods instead of frying them. Add less fat, such as margarine, sour cream, regular salad dressing and mayonnaise to foods. Eat fewer high-fat foods. Some examples of high-fat foods include french fries, doughnuts, ice  cream, and cakes.  Eat fewer sweets.  Limit foods and drinks that are high in sugar. This includes candy, cookies, regular soda, and sweetened drinks.  Exercise:  Exercise at least 30 minutes per day on most days of the week. Some examples of exercise include walking, biking, dancing, and swimming. You can also fit in more physical activity by taking the stairs instead of the elevator or parking farther away from stores. Ask your healthcare provider about the best exercise plan for you.      © Copyright [employer] 2018 Information is for End User's use only and may not be sold, redistributed or otherwise used for commercial purposes. All illustrations and images included in CareNotes® are the copyrighted property of [employer]., Inc. or [employer]

## 2025-02-03 NOTE — PROGRESS NOTES
Name: Yousuf Anna      : 1965      MRN: [id number]  Encounter Provider: Nghia Manning MD  Encounter Date: 2/3/2025   Encounter department: [facility]    Assessment & Plan  Other chest pain  Chest pain occurring with strenuous exercise, intermittent palpitations at rest.  Denies increased caffeine intake, chest pain at rest.  Denies any syncope or presyncopal episodes.  Physical exam showed normal s1, s2 sounds with no murmurs, rubs or gallops noted.  No leg swelling, no hepagojugular reflex.  EKG showed normal sinus rhythm.  Discussed possible etiology of chest pain with exertion, recommended stress test.  Previous stress test from  unremarkable but recommended updated stress test.  Routine labs ordered  Orders:    Stress test only, exercise; Future    ECG 12 lead; Future    TSH + Free T4; Future    Welcome to Medicare preventive visit  59 year old male presents for annual physical.  Discussed concerns for chest pain, see plan for 'Other Chest Pain'.  Improving diet and incorporating exercise to tolerance.  Denies increased alcohol intake.    Orders:    ECG 12 lead; Future    Screening for cardiovascular condition    Orders:    Lipid panel; Future    BMI 31.0-31.9,adult         Hyperuricemia    Orders:    allopurinol (ZYLOPRIM) 300 mg tablet; Take 1 tablet (300 mg total) by mouth daily    Chronic gout without tophus, unspecified cause, unspecified site    Orders:    allopurinol (ZYLOPRIM) 300 mg tablet; Take 1 tablet (300 mg total) by mouth daily      Depression Screening and Follow-up Plan: Patient was screened for depression during today's encounter. They screened negative with a PHQ-2 score of 0.      Preventive health issues were discussed with patient, and age appropriate screening tests were ordered as noted in patient's After Visit Summary. Personalized health advice and appropriate referrals for health education or preventive services given if needed, as noted in  "patient's After Visit Summary.    History of Present Illness     59 year old male presents for his annual physical.  He describes his health to be better if not the same since last year.  He improved his diet to eating 5-6 fruits/vegetables per day.  He started to increase his daily exercise however he was complaining of increasing chest pain with exertion.  Denies any syncopal episodes.  He states at rest he feels \"palpitations\" intermittently.  This chest pain does not occur at rest.  He states it may be muscular related.  Other than what is noted above, he has no other concerns at this time.        Patient Care Team:  Nghia Manning MD as PCP - General (Family Medicine)  MD Miriam Cody DO Brett Gibson, MD Sinan Kutty, MD as Endoscopist    Review of Systems   Constitutional:  Negative for chills and fever.   HENT:  Negative for ear pain and sore throat.    Eyes:  Negative for pain and visual disturbance.   Respiratory:  Negative for cough and shortness of breath.    Cardiovascular:  Positive for chest pain (with exercise exertion) and palpitations (intermittent). Negative for leg swelling.   Gastrointestinal:  Negative for abdominal pain and vomiting.   Genitourinary:  Negative for dysuria and hematuria.   Musculoskeletal:  Negative for arthralgias and back pain.   Skin:  Negative for color change and rash.   Neurological:  Negative for seizures and syncope.   All other systems reviewed and are negative.    Medical History Reviewed by provider this encounter:       Annual Wellness Visit Questionnaire   Yousuf is here for his Welcome to Medicare visit.     Health Risk Assessment:   Patient rates overall health as good. Patient feels that their physical health rating is same. Patient is satisfied with their life. Eyesight was rated as same. Hearing was rated as same. Patient feels that their emotional and mental health rating is same. Patients states they are never, rarely angry. Patient " states they are sometimes unusually tired/fatigued. Pain experienced in the last 7 days has been some. Patient's pain rating has been 4/10. Patient states that he has experienced no weight loss or gain in last 6 months.     Depression Screening:   PHQ-2 Score: 0      Fall Risk Screening:   In the past year, patient has experienced: no history of falling in past year      Home Safety:  Patient does not have trouble with stairs inside or outside of their home. Patient has working smoke alarms and has working carbon monoxide detector. Home safety hazards include: none.     Nutrition:   Current diet is Regular.     Medications:   Patient is not currently taking any over-the-counter supplements. Patient is able to manage medications.     Activities of Daily Living (ADLs)/Instrumental Activities of Daily Living (IADLs):   Walk and transfer into and out of bed and chair?: Yes  Dress and groom yourself?: Yes    Bathe or shower yourself?: Yes    Feed yourself? Yes  Do your laundry/housekeeping?: Yes  Manage your money, pay your bills and track your expenses?: Yes  Make your own meals?: Yes    Do your own shopping?: Yes    Advance Care Planning:   Living will: Yes    Advanced directive: Yes    Advanced directive counseling given: Yes    End of Life Decisions reviewed with patient: Yes      PREVENTIVE SCREENINGS      Cardiovascular Screening:    General: Screening Not Indicated and History Lipid Disorder      Diabetes Screening:     General: Screening Current      Colorectal Cancer Screening:     General: Screening Current      Prostate Cancer Screening:    General: Risks and Benefits Discussed      Osteoporosis Screening:    General: Risks and Benefits Discussed      Abdominal Aortic Aneurysm (AAA) Screening:        General: Risks and Benefits Discussed      Lung Cancer Screening:     General: Screening Not Indicated      Hepatitis C Screening:    General: Screening Current    Screening, Brief Intervention, and Referral to  "Treatment (SBIRT)    Screening      Single Item Drug Screening:  How often have you used an illegal drug (including marijuana) or a prescription medication for non-medical reasons in the past year? never    Single Item Drug Screen Score: 0  Interpretation: Negative screen for possible drug use disorder    Other Counseling Topics:   Skin self-exam, sunscreen and regular weightbearing exercise.        No results found.    Objective   /78   Pulse 71   Temp 97.8 °F (36.6 °C)   Ht 5' 11\" (1.803 m)   Wt 101 kg (223 lb)   SpO2 97%   BMI 31.10 kg/m²     Physical Exam  Vitals and nursing note reviewed.   Constitutional:       General: He is not in acute distress.     Appearance: He is well-developed.   HENT:      Head: Normocephalic and atraumatic.   Eyes:      Conjunctiva/sclera: Conjunctivae normal.   Cardiovascular:      Rate and Rhythm: Normal rate and regular rhythm.      Pulses: Normal pulses.      Heart sounds: Normal heart sounds. No murmur heard.     No friction rub. No gallop.   Pulmonary:      Effort: Pulmonary effort is normal. No respiratory distress.      Breath sounds: Normal breath sounds.   Abdominal:      Palpations: Abdomen is soft.      Tenderness: There is no abdominal tenderness.   Musculoskeletal:         General: No swelling.      Cervical back: Neck supple.      Right lower leg: No edema.      Left lower leg: No edema.   Skin:     General: Skin is warm and dry.      Capillary Refill: Capillary refill takes less than 2 seconds.   Neurological:      Mental Status: He is alert.   Psychiatric:         Mood and Affect: Mood normal.          Nutrition Assessment and Intervention:     Nutrition patient handout reviewed with patient      Physical Activity Assessment and Intervention:    Physical activity online resources/apps provided to patient      Emotional and Mental Well-being, Sleep, Connectedness Assessment and Intervention:    Stress management, meditation, yoga, or sleep online " resources/apps provided to patient      Tobacco and Toxic Substance Assessment and Intervention:     Tobacco and toxic substance use patient handout reviewed with patient

## 2025-02-12 ENCOUNTER — TELEPHONE (OUTPATIENT)
Age: 60
End: 2025-02-12

## 2025-02-12 LAB
CHOLEST SERPL-MCNC: 186 MG/DL (ref 100–199)
CHOLEST/HDLC SERPL: 3.4 RATIO (ref 0–5)
HDLC SERPL-MCNC: 55 MG/DL
LDLC SERPL CALC-MCNC: 105 MG/DL (ref 0–99)
SL AMB VLDL CHOLESTEROL CALC: 26 MG/DL (ref 5–40)
TRIGL SERPL-MCNC: 150 MG/DL (ref 0–149)

## 2025-02-12 NOTE — TELEPHONE ENCOUNTER
Patient would like to know why all labs that are usually ordered for his physicals were not this time.     Are there any other labs you would like him to complete at this time?    Please call to discuss. Thank you!

## 2025-02-14 LAB
ALBUMIN SERPL-MCNC: 4.3 G/DL (ref 3.8–4.9)
ALP SERPL-CCNC: 59 IU/L (ref 44–121)
ALT SERPL-CCNC: 32 IU/L (ref 0–44)
AST SERPL-CCNC: 27 IU/L (ref 0–40)
BILIRUB SERPL-MCNC: 0.4 MG/DL (ref 0–1.2)
BUN SERPL-MCNC: 16 MG/DL (ref 6–24)
BUN/CREAT SERPL: 13 (ref 9–20)
CALCIUM SERPL-MCNC: 9.4 MG/DL (ref 8.7–10.2)
CHLORIDE SERPL-SCNC: 101 MMOL/L (ref 96–106)
CO2 SERPL-SCNC: 23 MMOL/L (ref 20–29)
CREAT SERPL-MCNC: 1.2 MG/DL (ref 0.76–1.27)
EGFR: 70 ML/MIN/1.73
GLOBULIN SER-MCNC: 2.6 G/DL (ref 1.5–4.5)
GLUCOSE SERPL-MCNC: 98 MG/DL (ref 70–99)
POTASSIUM SERPL-SCNC: 4.3 MMOL/L (ref 3.5–5.2)
PROT SERPL-MCNC: 6.9 G/DL (ref 6–8.5)
PSA SERPL-MCNC: 0.3 NG/ML (ref 0–4)
SODIUM SERPL-SCNC: 138 MMOL/L (ref 134–144)

## 2025-02-14 NOTE — TELEPHONE ENCOUNTER
Patient calling back to ask for uric acid lab test and coronary CT scan.     He wants these things done before he comes in to see the doctor in March.     Please call to discuss. Thank you!

## 2025-02-17 ENCOUNTER — TELEPHONE (OUTPATIENT)
Dept: UROLOGY | Facility: CLINIC | Age: 60
End: 2025-02-17

## 2025-02-17 ENCOUNTER — RESULTS FOLLOW-UP (OUTPATIENT)
Dept: UROLOGY | Facility: CLINIC | Age: 60
End: 2025-02-17

## 2025-02-17 NOTE — TELEPHONE ENCOUNTER
LM for PT relaying PSA low and stable at 0.3. Patient did not have follow-up after initial consult offer to reschedule if patient desires         ----- Message from Oscar Robledo PA-C sent at 2/17/2025  8:18 AM EST -----  PSA low and stable at 0.3.  Patient did not have follow-up after initial consult offer to reschedule if patient desires

## 2025-02-19 NOTE — TELEPHONE ENCOUNTER
Yousuf is requesting scripts for A1C and Uric Acid as he completes those on a yearly basis.    Please contact to advise once orders placed.

## 2025-02-22 LAB
T4 FREE SERPL DIALY-MCNC: 1.2 NG/DL
TSH SERPL-ACNC: 2.1 UU/ML

## 2025-02-24 ENCOUNTER — HOSPITAL ENCOUNTER (OUTPATIENT)
Dept: NON INVASIVE DIAGNOSTICS | Facility: HOSPITAL | Age: 60
Discharge: HOME | End: 2025-02-24
Payer: COMMERCIAL

## 2025-02-24 ENCOUNTER — TELEPHONE (OUTPATIENT)
Age: 60
End: 2025-02-24

## 2025-02-24 VITALS
OXYGEN SATURATION: 96 % | RESPIRATION RATE: 20 BRPM | HEART RATE: 70 BPM | SYSTOLIC BLOOD PRESSURE: 152 MMHG | DIASTOLIC BLOOD PRESSURE: 90 MMHG

## 2025-02-24 DIAGNOSIS — R07.89 OTHER CHEST PAIN: ICD-10-CM

## 2025-02-24 PROCEDURE — 93017 CV STRESS TEST TRACING ONLY: CPT

## 2025-02-24 PROCEDURE — 93016 CV STRESS TEST SUPVJ ONLY: CPT | Performed by: INTERNAL MEDICINE

## 2025-02-24 PROCEDURE — 93018 CV STRESS TEST I&R ONLY: CPT | Performed by: INTERNAL MEDICINE

## 2025-02-24 NOTE — TELEPHONE ENCOUNTER
Patients GI provider:  Dr. Barriga    Number to return call: [phone]    Reason for call: Pt calling to request order for CT abdomen be reissued, . Please have doc reissue and call pt back once order is in.    Scheduled procedure/appointment date if applicable: N/A

## 2025-02-24 NOTE — TELEPHONE ENCOUNTER
Pt's last OV 12/8/22. He had multiple appts since then scheduled that were rescheduled and/or canceled. 2/15/24 Telephone encounter shows conversation regarding approval for CT scan pending which was then marked as denied under referrals.

## 2025-02-25 LAB
CHEST PAIN STATEMENT: NORMAL
MAX DIASTOLIC BP: 92 MMHG
MAX HR PERCENT: 91 %
MAX HR: 148 BPM
MAX PREDICTED HEART RATE: 161 BPM
PROTOCOL NAME: NORMAL
RATE PRESSURE PRODUCT: NORMAL
SL CV STRESS RECOVERY BP: NORMAL MMHG
SL CV STRESS RECOVERY HR: 83 BPM
SL CV STRESS RECOVERY O2 SAT: 97 %
SL CV STRESS STAGE REACHED: 3
STRESS ANGINA INDEX: 0
STRESS BASELINE BP: NORMAL MMHG
STRESS BASELINE HR: 70 BPM
STRESS O2 SAT REST: 96 %
STRESS PEAK HR: 148 BPM
STRESS POST ESTIMATED WORKLOAD: 10.1 METS
STRESS POST EXERCISE DUR MIN: 8 MIN
STRESS POST EXERCISE DUR MIN: 8 MIN
STRESS POST EXERCISE DUR SEC: 31 SEC
STRESS POST EXERCISE DUR SEC: 31 SEC
STRESS POST O2 SAT PEAK: 92 %
STRESS POST PEAK BP: 228 MMHG
STRESS POST PEAK HR: 148 BPM
STRESS POST PEAK SYSTOLIC BP: 228 MMHG
TARGET HR FORMULA: NORMAL
TEST INDICATION: NORMAL

## 2025-02-25 NOTE — TELEPHONE ENCOUNTER
LVM for pt advising to call back to schedule soonest available NON urgent appt in order to discuss needs for testing (CT scan).

## 2025-03-03 ENCOUNTER — OFFICE VISIT (OUTPATIENT)
Age: 60
End: 2025-03-03

## 2025-03-03 VITALS
WEIGHT: 223 LBS | SYSTOLIC BLOOD PRESSURE: 132 MMHG | HEART RATE: 74 BPM | OXYGEN SATURATION: 97 % | TEMPERATURE: 97.6 F | HEIGHT: 71 IN | DIASTOLIC BLOOD PRESSURE: 78 MMHG | BODY MASS INDEX: 31.22 KG/M2

## 2025-03-03 DIAGNOSIS — R73.03 PREDIABETES: ICD-10-CM

## 2025-03-03 DIAGNOSIS — R00.2 PALPITATIONS: ICD-10-CM

## 2025-03-03 DIAGNOSIS — R07.89 OTHER CHEST PAIN: Primary | ICD-10-CM

## 2025-03-03 DIAGNOSIS — R03.0 ELEVATED BLOOD PRESSURE READING: ICD-10-CM

## 2025-03-03 DIAGNOSIS — E79.0 HYPERURICEMIA: ICD-10-CM

## 2025-03-03 RX ORDER — LISINOPRIL 5 MG/1
5 TABLET ORAL DAILY
Qty: 30 TABLET | Refills: 3 | Status: SHIPPED | OUTPATIENT
Start: 2025-03-03

## 2025-03-03 NOTE — ASSESSMENT & PLAN NOTE
Chronic gout, under control.  Initiated dietary modifications such as reducing red meat and alcohol intake.  Gout controlled with allopurinol, continue to monitor symptoms.  Orders:    Uric acid; Future

## 2025-03-03 NOTE — PROGRESS NOTES
Name: Yousuf Anna      : 1965      MRN: [id number]  Encounter Provider: Nghia Manning MD  Encounter Date: 3/3/2025   Encounter department: [facility]  :  Assessment & Plan  Other chest pain  Chest pain occurring with increased exertion when patient initiated exercise treatment plan.  Good adherence to dietary modifications.  Reviewed recent stress test results with patient, showing hypertensive episodes upon increasing exercise intensity on treadmill.  Initiated pt on low dose lisinopril for blood pressure control.  Referred patient for updated echocardiogram as well as referral for cardiology for further workup as well.  Will see patient back in office after cardiology workup is complete.  Orders:    Ambulatory Referral to Cardiology; Future    Echo complete w/ contrast if indicated; Future    Hyperuricemia  Chronic gout, under control.  Initiated dietary modifications such as reducing red meat and alcohol intake.  Gout controlled with allopurinol, continue to monitor symptoms.  Orders:    Uric acid; Future    Elevated blood pressure reading  Blood pressure readings at home have been in the 140s systolic.  Initiated patient on low-dose lisinopril, will continue to monitor blood pressure at home.  Orders:    lisinopril (ZESTRIL) 5 mg tablet; Take 1 tablet (5 mg total) by mouth daily    Palpitations  Intermittent palpitations with exercise exertion.  Echocardiogram ordered as well as referral for cardiology.  Will follow-up after cardiac workup is complete.  Orders:    Ambulatory Referral to Cardiology; Future    Echo complete w/ contrast if indicated; Future    Prediabetes    Orders:    Hemoglobin A1C; Future           History of Present Illness   60-year-old male presents to the clinic to discuss recent results of his stress test.  He notes increased chest pain and discomfort when he exercises for increased duration.  He denies any vision changes, however intermittently he gets  "lightheaded.  He denies any syncopal episodes.  He is also inquiring about getting an annual uric acid test as well due to his history of gout.  He denies any recent gout flareups, but wants to be \" on top of that\".      Review of Systems   Constitutional:  Negative for chills and fever.   HENT:  Negative for ear pain and sore throat.    Eyes:  Negative for pain and visual disturbance.   Respiratory:  Negative for cough and shortness of breath.    Cardiovascular:  Positive for chest pain (With exertion). Negative for palpitations.   Gastrointestinal:  Negative for abdominal pain and vomiting.   Genitourinary:  Negative for dysuria and hematuria.   Musculoskeletal:  Negative for arthralgias and back pain.   Skin:  Negative for color change and rash.   Neurological:  Negative for seizures and syncope.   All other systems reviewed and are negative.      Objective   /78   Pulse 74   Temp 97.6 °F (36.4 °C)   Ht 5' 11\" (1.803 m)   Wt 101 kg (223 lb)   SpO2 97%   BMI 31.10 kg/m²      Physical Exam  Vitals and nursing note reviewed.   Constitutional:       General: He is not in acute distress.     Appearance: He is well-developed. He is obese.   HENT:      Head: Normocephalic and atraumatic.   Eyes:      Conjunctiva/sclera: Conjunctivae normal.   Cardiovascular:      Rate and Rhythm: Normal rate and regular rhythm.      Heart sounds: No murmur heard.  Pulmonary:      Effort: Pulmonary effort is normal. No respiratory distress.      Breath sounds: Normal breath sounds.   Abdominal:      Palpations: Abdomen is soft.      Tenderness: There is no abdominal tenderness.   Musculoskeletal:         General: No swelling.      Cervical back: Neck supple.   Skin:     General: Skin is warm and dry.      Capillary Refill: Capillary refill takes less than 2 seconds.   Neurological:      Mental Status: He is alert.   Psychiatric:         Mood and Affect: Mood normal.       Nutrition Assessment and Intervention:     Nutrition " patient handout reviewed with patient      Physical Activity Assessment and Intervention:    Physical activity online resources/apps provided to patient      Emotional and Mental Well-being, Sleep, Connectedness Assessment and Intervention:    Stress management, meditation, yoga, or sleep online resources/apps provided to patient      Tobacco and Toxic Substance Assessment and Intervention:     Tobacco and toxic substance use patient handout reviewed with patient

## 2025-03-07 DIAGNOSIS — R03.0 ELEVATED BLOOD PRESSURE READING: Primary | ICD-10-CM

## 2025-03-07 RX ORDER — LISINOPRIL 10 MG/1
10 TABLET ORAL DAILY
Qty: 30 TABLET | Refills: 3 | Status: SHIPPED | OUTPATIENT
Start: 2025-03-07

## 2025-03-26 ENCOUNTER — HOSPITAL ENCOUNTER (OUTPATIENT)
Dept: NON INVASIVE DIAGNOSTICS | Facility: CLINIC | Age: 60
Discharge: HOME | End: 2025-03-26
Payer: COMMERCIAL

## 2025-03-26 VITALS
BODY MASS INDEX: 31.17 KG/M2 | SYSTOLIC BLOOD PRESSURE: 132 MMHG | WEIGHT: 222.66 LBS | DIASTOLIC BLOOD PRESSURE: 78 MMHG | HEART RATE: 74 BPM | HEIGHT: 71 IN

## 2025-03-26 DIAGNOSIS — R07.89 OTHER CHEST PAIN: ICD-10-CM

## 2025-03-26 DIAGNOSIS — R00.2 PALPITATIONS: ICD-10-CM

## 2025-03-26 LAB
AORTIC ROOT: 4.1 CM
AORTIC VALVE MEAN VELOCITY: 7.6 M/S
ASCENDING AORTA: 3.8 CM
AV AREA BY CONTINUOUS VTI: 3.1 CM2
AV AREA PEAK VELOCITY: 3 CM2
AV LVOT MEAN GRADIENT: 2 MMHG
AV LVOT PEAK GRADIENT: 4 MMHG
AV MEAN PRESS GRAD SYS DOP V1V2: 3 MMHG
AV ORIFICE AREA US: 3.06 CM2
AV PEAK GRADIENT: 6 MMHG
AV VELOCITY RATIO: 0.81
AV VMAX SYS DOP: 1.2 M/S
BSA FOR ECHO PROCEDURE: 2.21 M2
DOP CALC AO VTI: 24.46 CM
DOP CALC LVOT AREA: 3.8 CM2
DOP CALC LVOT CARDIAC INDEX: 2.05 L/MIN/M2
DOP CALC LVOT CARDIAC OUTPUT: 4.52 L/MIN
DOP CALC LVOT DIAMETER: 2.2 CM
DOP CALC LVOT PEAK VEL VTI: 19.73 CM
DOP CALC LVOT PEAK VEL: 0.95 M/S
DOP CALC LVOT STROKE INDEX: 33.5 ML/M2
DOP CALC LVOT STROKE VOLUME: 74.96
E WAVE DECELERATION TIME: 207 MS
E/A RATIO: 0.9
FRACTIONAL SHORTENING: 40 (ref 28–44)
INTERVENTRICULAR SEPTUM IN DIASTOLE (PARASTERNAL SHORT AXIS VIEW): 1.2 CM
INTERVENTRICULAR SEPTUM: 1.2 CM (ref 0.6–1.1)
LAAS-AP2: 26 CM2
LAAS-AP4: 13.8 CM2
LEFT ATRIUM SIZE: 3.4 CM
LEFT ATRIUM VOLUME (MOD BIPLANE): 61 ML
LEFT ATRIUM VOLUME INDEX (MOD BIPLANE): 27.6 ML/M2
LEFT INTERNAL DIMENSION IN SYSTOLE: 3.2 CM (ref 2.1–4)
LEFT VENTRICULAR INTERNAL DIMENSION IN DIASTOLE: 5.3 CM (ref 3.5–6)
LEFT VENTRICULAR POSTERIOR WALL IN END DIASTOLE: 1.2 CM
LEFT VENTRICULAR STROKE VOLUME: 95 ML
LV EF US.2D.A4C+ESTIMATED: 61 %
LVSV (TEICH): 95 ML
MV E'TISSUE VEL-LAT: 8 CM/S
MV E'TISSUE VEL-SEP: 7 CM/S
MV PEAK A VEL: 0.67 M/S
MV PEAK E VEL: 60 CM/S
RIGHT ATRIUM AREA SYSTOLE A4C: 20.2 CM2
RIGHT VENTRICLE ID DIMENSION: 4.3 CM
SINOTUBULAR JUNCTION: 3.3 CM
SL CV LEFT ATRIUM LENGTH A2C: 5.9 CM
SL CV LV EF: 60
SL CV PED ECHO LEFT VENTRICLE DIASTOLIC VOLUME (MOD BIPLANE) 2D: 137 ML
SL CV PED ECHO LEFT VENTRICLE SYSTOLIC VOLUME (MOD BIPLANE) 2D: 42 ML
SL CV SINUS OF VALSALVA 2D: 4 CM
STJ: 3.3 CM
TR MAX PG: 18 MMHG
TR PEAK VELOCITY: 2.1 M/S
TRICUSPID ANNULAR PLANE SYSTOLIC EXCURSION: 3.3 CM
TRICUSPID VALVE PEAK REGURGITATION VELOCITY: 2.09 M/S

## 2025-03-26 PROCEDURE — 93306 TTE W/DOPPLER COMPLETE: CPT | Performed by: INTERNAL MEDICINE

## 2025-03-26 PROCEDURE — 93306 TTE W/DOPPLER COMPLETE: CPT

## 2025-04-10 ENCOUNTER — TELEPHONE (OUTPATIENT)
Dept: CARDIOLOGY CLINIC | Facility: CLINIC | Age: 60
End: 2025-04-10

## 2025-04-29 ENCOUNTER — OFFICE VISIT (OUTPATIENT)
Age: 60
End: 2025-04-29
Payer: COMMERCIAL

## 2025-04-29 VITALS
HEART RATE: 74 BPM | BODY MASS INDEX: 29.93 KG/M2 | DIASTOLIC BLOOD PRESSURE: 72 MMHG | SYSTOLIC BLOOD PRESSURE: 122 MMHG | WEIGHT: 221 LBS | HEIGHT: 72 IN

## 2025-04-29 DIAGNOSIS — Z86.0100 HISTORY OF COLON POLYPS: ICD-10-CM

## 2025-04-29 DIAGNOSIS — K76.0 HEPATIC STEATOSIS: Primary | ICD-10-CM

## 2025-04-29 DIAGNOSIS — F10.90 METALD: ICD-10-CM

## 2025-04-29 DIAGNOSIS — K76.0 METALD: ICD-10-CM

## 2025-04-29 DIAGNOSIS — D69.6 THROMBOCYTOPENIA (HCC): ICD-10-CM

## 2025-04-29 DIAGNOSIS — K76.89 HEPATIC CYST: ICD-10-CM

## 2025-04-29 PROCEDURE — 99214 OFFICE O/P EST MOD 30 MIN: CPT | Performed by: STUDENT IN AN ORGANIZED HEALTH CARE EDUCATION/TRAINING PROGRAM

## 2025-04-29 RX ORDER — SODIUM CHLORIDE, SODIUM LACTATE, POTASSIUM CHLORIDE, CALCIUM CHLORIDE 600; 310; 30; 20 MG/100ML; MG/100ML; MG/100ML; MG/100ML
125 INJECTION, SOLUTION INTRAVENOUS CONTINUOUS
OUTPATIENT
Start: 2025-04-29

## 2025-04-29 NOTE — ASSESSMENT & PLAN NOTE
60 y.o male with history of HTN, HLD, Hypertriglyceridemia, gout, and prediabetes for presents for consultation regarding hepatic steatosis.     Patient likely has MetALD given his significant metabolic risk factors and EtOH use history. We discussed the basic pathophysiology of MASLD and the potential to progress to cirrhosis if left untreated. He did obtain an ultrasound elastography on 2/2024 which revealed S3, F0-F1, IQR 4.9%.  His liver tests are within normal limits, synthetic function appears to be preserved, but he is thrombocytopenic as of last year.      We will repeat his CBC, CMP and additionally evaluate iron studies, and A1 AT Pi* carrier status. Patient will complete another Abdominal US with elastography to better assess for advanced fibrosis. We will additionally add an MRI hepatoma protocol for further evaluation of known hepatic cysts as he was unable to have this completed in 2022 and he is interested in pursuing abdominal imaging.    He will follow up in clinic in 6 months or sooner if needed.     Discussed the current treatment for MASLD. He is not a candidate for Rezdiffra given his F0-F1 ds. We discussed the importance of steady, sustainable weight loss of approximately 10% of patient's current body weight, continued treatment for metabolic risk factors and alcohol cessation. He appears motivated to exercise and eat healthy. He has excellent support at home.    Discussed consideration of a GLP-1a therapy given his weight and prediabetic status. He will discuss this with PCP if patient is unable to lose weight over the next few months.  These medications help to reduce steatohepatitis and if present, may potentially reverse hepatic fibrosis. Otherwise, recommend the patient has liver tests monitored q6 months with routine labs.     Fibrosis-4 (FIB-4) Score cannot be calculated. One or more of the required components has not been resulted in the past year     Orders:  •  CBC and differential;  Future  •  Comprehensive metabolic panel; Future  •  US elastography/UGAP; Future  •  US abdomen limited; Future  •  MRI abdomen w wo contrast; Future  •  Alpha 1 Antitrypsin Phenotype; Future  •  Iron Panel (Includes Ferritin, Iron Sat%, Iron, and TIBC); Future

## 2025-04-29 NOTE — PATIENT INSTRUCTIONS
"Scheduled date of colonoscopy (as of today):5/27/25  Physician performing colonoscopy:Dr. Trudi Kraft  Location of colonoscopy:[facility]  Bowel prep reviewed with patient:Miralax/Dulcolax  Instructions reviewed with patient by:Eve  Clearances: N/A      Pt states he will schedule US Abdomen Limited, MRI Abdomen w wo Contrast, and US Elastography/UGAP;\"Central Scheduling\" phone number given to Pt at OV.  "

## 2025-04-29 NOTE — PROGRESS NOTES
Name: Yousuf Anna      : 1965      MRN: [id number]  Encounter Provider: Sofiya Barriga MD  Encounter Date: 2025   Encounter department: [facility] [address]  :  Assessment & Plan  Hepatic steatosis  60 y.o male with history of HTN, HLD, Hypertriglyceridemia, gout, and prediabetes for presents for consultation regarding hepatic steatosis.     Patient likely has MetALD given his significant metabolic risk factors and EtOH use history. We discussed the basic pathophysiology of MASLD and the potential to progress to cirrhosis if left untreated. He did obtain an ultrasound elastography on 2024 which revealed S3, F0-F1, IQR 4.9%.  His liver tests are within normal limits, synthetic function appears to be preserved, but he is thrombocytopenic as of last year.      We will repeat his CBC, CMP and additionally evaluate iron studies, and A1 AT Pi* carrier status. Patient will complete another Abdominal US with elastography to better assess for advanced fibrosis. We will additionally add an MRI hepatoma protocol for further evaluation of known hepatic cysts as he was unable to have this completed in  and he is interested in pursuing abdominal imaging.    He will follow up in clinic in 6 months or sooner if needed.     Discussed the current treatment for MASLD. He is not a candidate for Rezdiffra given his F0-F1 ds. We discussed the importance of steady, sustainable weight loss of approximately 10% of patient's current body weight, continued treatment for metabolic risk factors and alcohol cessation. He appears motivated to exercise and eat healthy. He has excellent support at home.    Discussed consideration of a GLP-1a therapy given his weight and prediabetic status. He will discuss this with PCP if patient is unable to lose weight over the next few months.  These medications help to reduce steatohepatitis and if present, may potentially reverse hepatic fibrosis. Otherwise,  recommend the patient has liver tests monitored q6 months with routine labs.     Fibrosis-4 (FIB-4) Score cannot be calculated. One or more of the required components has not been resulted in the past year     Orders:  •  CBC and differential; Future  •  Comprehensive metabolic panel; Future  •  US elastography/UGAP; Future  •  US abdomen limited; Future  •  MRI abdomen w wo contrast; Future  •  Alpha 1 Antitrypsin Phenotype; Future  •  Iron Panel (Includes Ferritin, Iron Sat%, Iron, and TIBC); Future    Hepatic cyst  As above.  Orders:  •  MRI abdomen w wo contrast; Future  •  Alpha 1 Antitrypsin Phenotype; Future  •  Iron Panel (Includes Ferritin, Iron Sat%, Iron, and TIBC); Future    History of colon polyps  Due for screening. Hx of colon polyps in 2016, per patient.   The risks/benefits/alternatives of the procedure were discussed with the patient. Risks include, but are not limited to, infection, bleeding, perforation, injury to abdominal organs, missed lesions and incomplete procedure. Patient was agreeable.    Orders:  •  Colonoscopy; Future        History of Present Illness {?Quick Links Encounters * My Last Note * Last Note in Specialty * Snapshot * Since Last Visit * History :30431}  HPI  Yousuf Anna is a 60 y.o. male with history of HTN, HLD, Hypertriglyceridemia, gout, and prediabetes for presents for consultation regarding hepatic steatosis.     Interval History:  - Last appointment with Dr. Barriga (12/08/2022) for presumed MASLD and hepatic cysts.  - CMP (2/2025) with LFTs wnl.   - Elastography (2/07/2024) revealed F0-F1 fibrosis. IQR 4.9%.  - Serologic work-up normal thus far. Immune to Hep A, not Hep B. Chronic Hepatitis panel previously       negative, ceruloplasmin normal, and PT/INR (2/2024) wnl.   - No abdominal pain or other complaints today. Feels well.     Extended Liver History:  The patient was previously seen in clinic in 2022 for hepatic steatosis and liver cysts. He was noted to have  "multiple hepatic cysts measuring <1 cm on CT dating back to 2015. He had a RUQ US which showed moderate hepatic steatosis with multiple hepatic cysts with normal appearing gallbladder and without biliary ductal dilatation. Previously was seen here in clinic for vague RUQ \"discomfort\" which has since resolved after stopping his multivitamin and cutting back on EtOH use.     He reports history of EtOH use with reported consumption of ~14-20 drinks weekly, but now does not drink every day. He reports history of DUI in the past. No work-related issues with EtOH.     Regarding his weight, his lowest weight was 235 lbs in 2024 and he has lost about 10-15 lbs. He has been avoiding fast food. Diet is improving. Interested in exercising with his wife and family in their home gym. Work and stress were previous deterrents for exercise.     He does report a family hx of alcoholism. He denies tobacco and recreational drug use.     History obtained from: patient    Review of Systems      Medical History Reviewed by provider this encounter:     .  Past Medical History   Past Medical History:   Diagnosis Date   • Gout    • Hypertension    • Male erectile dysfunction     [id number] RESOLVED   • Wrist fracture     LEFT     Past Surgical History:   Procedure Laterality Date   • COLONOSCOPY N/A 6/10/2016    Procedure: COLONOSCOPY;  Surgeon: Pardeep Garsia MD;  Location: [facility] GI LAB;  Service:    • KNEE ARTHROSCOPY Left     WITH MEDIAL MENISCETOMY   • [place] ARTHRS KNE SURG W/MENISCECTOMY MED/LAT W/SHVG Right 8/18/2016    Procedure: KNEE ARTHROSCOPIC PARTIAL MEDIAL MENISCECTOMY ;  Surgeon: Cali Vazquez MD;  Location: [facility];  Service: Orthopedics   • VASECTOMY      WITH REVERSAL     Family History   Problem Relation Age of Onset   • Coronary artery disease Mother    • Gout Mother    • Hypertension Mother    • Uterine cancer Mother    • Gout Father    • Hypertension Father    • Benign prostatic hyperplasia Father    • Gout Brother    • " Diabetes Daughter    • Gout Daughter    • Coronary artery disease Maternal Grandmother    • Hypertension Maternal Grandmother       reports that he has never smoked. He has never used smokeless tobacco. He reports current alcohol use of about 1.0 standard drink of alcohol per week. He reports that he does not use drugs.  Current Outpatient Medications   Medication Instructions   • allopurinol (ZYLOPRIM) 300 mg, Oral, Daily   • lisinopril (ZESTRIL) 5 mg, Oral, Daily   • lisinopril (ZESTRIL) 10 mg, Oral, Daily   • multivitamins-fortified A-D-K (SOURCECF) solution 0.5 mL, Daily   No Known Allergies   Current Outpatient Medications on File Prior to Visit   Medication Sig Dispense Refill   • allopurinol (ZYLOPRIM) 300 mg tablet Take 1 tablet (300 mg total) by mouth daily 90 tablet 1   • lisinopril (ZESTRIL) 10 mg tablet Take 1 tablet (10 mg total) by mouth daily (Patient not taking: Reported on 4/29/2025) 30 tablet 3   • lisinopril (ZESTRIL) 5 mg tablet Take 1 tablet (5 mg total) by mouth daily (Patient not taking: Reported on 4/29/2025) 30 tablet 3   • multivitamins-fortified A-D-K (SOURCECF) solution Take 0.5 mL by mouth daily. (Patient not taking: Reported on 4/29/2025)       No current facility-administered medications on file prior to visit.      Social History     Tobacco Use   • Smoking status: Never   • Smokeless tobacco: Never   Vaping Use   • Vaping status: Never Used   Substance and Sexual Activity   • Alcohol use: Yes     Alcohol/week: 1.0 standard drink of alcohol     Types: 1 Glasses of wine per week     Comment: 0-2 drinks per wk    • Drug use: No   • Sexual activity: Yes     Partners: Female     Birth control/protection: Surgical        Objective {?Quick Links Trend Vitals * Enter New Vitals * Results Review * Timeline (Adult) * Labs * Imaging * Cardiology * Procedures * Lung Cancer Screening * Surgical eConsent :37102}  /72 (BP Location: Right arm, Patient Position: Sitting, Cuff Size: Extra-Large)  "  Pulse 74   Ht 5' 11.5\" (1.816 m)   Wt 100 kg (221 lb)   BMI 30.39 kg/m²      Physical Exam      "

## 2025-05-05 ENCOUNTER — HOSPITAL ENCOUNTER (OUTPATIENT)
Dept: ULTRASOUND IMAGING | Facility: HOSPITAL | Age: 60
Discharge: HOME | End: 2025-05-05
Attending: STUDENT IN AN ORGANIZED HEALTH CARE EDUCATION/TRAINING PROGRAM
Payer: COMMERCIAL

## 2025-05-05 DIAGNOSIS — K76.0 HEPATIC STEATOSIS: ICD-10-CM

## 2025-05-05 PROCEDURE — 76981 USE PARENCHYMA: CPT

## 2025-05-05 PROCEDURE — 76705 ECHO EXAM OF ABDOMEN: CPT

## 2025-05-08 ENCOUNTER — RESULTS FOLLOW-UP (OUTPATIENT)
Age: 60
End: 2025-05-08

## 2025-05-13 ENCOUNTER — ANESTHESIA EVENT (OUTPATIENT)
Dept: ANESTHESIOLOGY | Facility: HOSPITAL | Age: 60
End: 2025-05-13

## 2025-05-13 ENCOUNTER — ANESTHESIA (OUTPATIENT)
Dept: ANESTHESIOLOGY | Facility: HOSPITAL | Age: 60
End: 2025-05-13

## 2025-05-20 ENCOUNTER — OFFICE VISIT (OUTPATIENT)
Dept: CARDIOLOGY CLINIC | Facility: CLINIC | Age: 60
End: 2025-05-20
Payer: COMMERCIAL

## 2025-05-20 VITALS
WEIGHT: 227 LBS | SYSTOLIC BLOOD PRESSURE: 142 MMHG | BODY MASS INDEX: 31.22 KG/M2 | DIASTOLIC BLOOD PRESSURE: 78 MMHG | HEART RATE: 61 BPM

## 2025-05-20 DIAGNOSIS — I10 ESSENTIAL HYPERTENSION: Primary | ICD-10-CM

## 2025-05-20 DIAGNOSIS — E78.1 HYPERTRIGLYCERIDEMIA: ICD-10-CM

## 2025-05-20 DIAGNOSIS — R00.2 PALPITATIONS: ICD-10-CM

## 2025-05-20 DIAGNOSIS — R07.9 CHEST PAIN, UNSPECIFIED TYPE: ICD-10-CM

## 2025-05-20 LAB
ATRIAL RATE: 61 BPM
P AXIS: 37 DEGREES
PR INTERVAL: 150 MS
QRS AXIS: 83 DEGREES
QRSD INTERVAL: 114 MS
QT INTERVAL: 412 MS
QTC INTERVAL: 414 MS
T WAVE AXIS: 28 DEGREES
VENTRICULAR RATE: 61 BPM

## 2025-05-20 PROCEDURE — 99204 OFFICE O/P NEW MOD 45 MIN: CPT | Performed by: INTERNAL MEDICINE

## 2025-05-20 PROCEDURE — 93000 ELECTROCARDIOGRAM COMPLETE: CPT | Performed by: INTERNAL MEDICINE

## 2025-05-20 NOTE — PROGRESS NOTES
Cardiology Consultation     Yousuf Anna  [id number]  1965  [facility]  [address]  [place] [id number]    Orders Placed This Encounter   Procedures    CT coronary calcium score    Zio Monitor    POCT ECG         Assessment & Plan  Essential hypertension  Reviewed with patient. Has been advised lisinopril in the past, he wants to try lifestyle first.  Mild LVH on echo, hypertensive response with exercise. Symptoms of hearing his pulsations at night all sound consistent with HTN.  Lifestyle changes.  I will await the zio monitor for palpitations to see if CCB or BB would be better, but I did advise that pharmacologic treatment for HTN would be recommended at least initially.  Chest pain, unspecified type  Negative stress test except HTN.  Lipid panel borderline, 10y ASCVD 7.4%  Check coronary Ca score  Palpitations  Come mostly at night, but sometimes during the day. Check zio patch.  Hypertriglyceridemia  Lifestyle modification, weight loss.  Check Ca score may need statin.          History of Present Illness:    60 year old man. Seen many years ago for borderline HTN.  Has family history of heart disease - father with MI, pacemaker, CHF although seems older when this developed.  Mother with CHF at around 50. PPM. Possibly arrhythmia, with PPM in place.    He has Symptoms of palpitations which seem to be the most prominent symptom he is describing to me. Present for about 2 years significant at night. Mostly sound like short episodes.  He feels a pulsation in his head, hears the heart beat as well.    Been advised by PCP that BP is elevated. He was prescribed 5 then 10mg lisinopril, but he's been reluctant and wanted to try increasing exercise first so he stopped it.      Testing done w echo and and ETT earlier this year, reviewed. Mild LVH. Aortic root mildly dilated.   Stress with high BP at rest and  hypertensive response.    Fatty liver, MRI is pending.    He said he asked for MRI of the heart but was advised to see cardiology. Sounds like he has contemplated the full body screen offered at outside facilities in the past.      Problem List[1]  Past Medical History:   Diagnosis Date    Gout     Hypertension     Male erectile dysfunction     [id number] RESOLVED    Wrist fracture     LEFT     Social History[2]   Family History   Problem Relation Age of Onset    Coronary artery disease Mother     Gout Mother     Hypertension Mother     Uterine cancer Mother     Gout Father     Hypertension Father     Benign prostatic hyperplasia Father     Gout Brother     Diabetes Daughter     Gout Daughter     Coronary artery disease Maternal Grandmother     Hypertension Maternal Grandmother      Past Surgical History:   Procedure Laterality Date    COLONOSCOPY N/A 6/10/2016    Procedure: COLONOSCOPY;  Surgeon: Pardeep Garsia MD;  Location: [facility] GI LAB;  Service:     KNEE ARTHROSCOPY Left     WITH MEDIAL MENISCETOMY    UT ARTHRS KNE SURG W/MENISCECTOMY MED/LAT W/SHVG Right 8/18/2016    Procedure: KNEE ARTHROSCOPIC PARTIAL MEDIAL MENISCECTOMY ;  Surgeon: Cali Vazquez MD;  Location: [facility];  Service: Orthopedics    VASECTOMY      WITH REVERSAL     Current Medications[3]  No Known Allergies    Vitals:    05/20/25 1056   BP: 142/78   BP Location: Left arm   Patient Position: Sitting   Cuff Size: Large   Pulse: 61   Weight: 103 kg (227 lb)     Vitals:    05/20/25 1056   Weight: 103 kg (227 lb)          Body mass index is 31.22 kg/m².    Physical Exam:  GENERAL: Alert, well appearing, and in no distress  HEENT:  PERRL, EOMI, no scleral icterus, no conjunctival pallor  NECK:  Supple, No elevated JVP, no thyromegaly, no carotid bruits  HEART:  Regular rate and rhythm, normal S1/S2, no S3/S4, no murmur or rub  LUNGS:  Clear to auscultation bilaterally  ABDOMEN:  Soft, non-tender, positive bowel sounds, no rebound or  guarding  EXTREMITIES:  No edema  VASCULAR:  Normal pedal pulses   NEURO: No focal deficits,  SKIN: Normal without suspicious lesions on exposed skin      ROS:  Except as noted in HPI, is otherwise reviewed in detail and a 12 point review of systems is negative.    Labs:  Lab Results   Component Value Date    SODIUM 138 02/13/2025    K 4.3 02/13/2025     02/13/2025    CREATININE 1.20 02/13/2025    BUN 16 02/13/2025    CO2 23 02/13/2025    ALT 32 02/13/2025    AST 27 02/13/2025    TSH 2.1 02/13/2025    INR 1.0 02/02/2024    HGBA1C 6.3 (H) 02/12/2024    WBC 5.9 02/12/2024    HGB 14.8 02/12/2024    HCT 44.7 02/12/2024     (L) 02/12/2024       Lab Results   Component Value Date    CHOL 186 02/15/2017    CHOL 188 02/26/2016    CHOL 193 12/04/2014     Lab Results   Component Value Date    HDL 55 02/11/2025    HDL 54 02/12/2024    HDL 53 02/02/2024     Lab Results   Component Value Date    LDLCALC 105 (H) 02/11/2025    LDLCALC 100 (H) 02/12/2024    LDLCALC 77 02/02/2024     Lab Results   Component Value Date    TRIG 150 (H) 02/11/2025    TRIG 91 02/12/2024    TRIG 122 02/02/2024       Testing:  Echo 3/26/2025  Left Ventricle: Left ventricular cavity size is normal. Wall thickness is mildly increased. There is mild concentric hypertrophy. The left ventricular ejection fraction is 60%. Systolic function is normal. Wall motion is normal. Diastolic function is mildly abnormal, consistent with grade I (abnormal) relaxation.    Right Ventricle: Right ventricular cavity size is normal.    Right Atrium: The atrium is mildly dilated.    Mitral Valve: There is trace regurgitation.    Aorta: The aortic root is mildly dilated. The ascending aorta is mildly dilated. The aortic root is 4.10 cm. The ascending aorta is 3.8 cm.    Stress 2/2025   Stress ECG: There were no arrhythmias during stress. The stress ECG is negative for ischemia after maximal exercise.    Stress ECG: The patient reached stage 3.0of the protocol after  exercising for 8 min and 31 sec and had a maximal HR of 148 bpm (91% of MPHR) and 10.1 METS. The patient experienced no angina during the test. The patient achieved the target heart rate. The test was stopped because of hypertension. Blood pressure demonstrated a hypertensive response and heart rate demonstrated a normal response to stress. The patient's heart rate recovery was normal.     Negative study for dynamic ST changes with exercise to suggest ischemia.  However hypertensive blood pressure response to 228/92 noted with mild exercise. Clinical correlation advised    EKG:  NSR. Borderline IVCD.         [1]   Patient Active Problem List  Diagnosis    Tear of medial meniscus of right knee    Allergic rhinitis    Gout    Reduced libido    Hyperuricemia    Testosterone deficiency    Overweight    Borderline hypertension    Prediabetes    Hypertriglyceridemia    Tenderness of neck    BMI 33.0-33.9,adult    Hepatic steatosis   [2]   Social History  Tobacco Use    Smoking status: Never    Smokeless tobacco: Never   Vaping Use    Vaping status: Never Used   Substance Use Topics    Alcohol use: Yes     Alcohol/week: 1.0 standard drink of alcohol     Types: 1 Glasses of wine per week     Comment: 0-2 drinks per wk     Drug use: No   [3]   Current Outpatient Medications:     allopurinol (ZYLOPRIM) 300 mg tablet, Take 1 tablet (300 mg total) by mouth daily, Disp: 90 tablet, Rfl: 1    lisinopril (ZESTRIL) 10 mg tablet, Take 1 tablet (10 mg total) by mouth daily (Patient not taking: Reported on 4/29/2025), Disp: 30 tablet, Rfl: 3    lisinopril (ZESTRIL) 5 mg tablet, Take 1 tablet (5 mg total) by mouth daily (Patient not taking: Reported on 4/29/2025), Disp: 30 tablet, Rfl: 3    multivitamins-fortified A-D-K (SOURCECF) solution, Take 0.5 mL by mouth daily. (Patient not taking: Reported on 4/29/2025), Disp: , Rfl:

## 2025-05-26 ENCOUNTER — HOSPITAL ENCOUNTER (OUTPATIENT)
Dept: MRI IMAGING | Facility: HOSPITAL | Age: 60
Discharge: HOME | End: 2025-05-26
Attending: PHYSICIAN ASSISTANT
Payer: COMMERCIAL

## 2025-05-26 DIAGNOSIS — K76.0 HEPATIC STEATOSIS: ICD-10-CM

## 2025-05-26 DIAGNOSIS — K76.89 HEPATIC CYST: ICD-10-CM

## 2025-05-26 PROCEDURE — 74183 MRI ABD W/O CNTR FLWD CNTR: CPT

## 2025-05-26 PROCEDURE — A9585 GADOBUTROL INJECTION: HCPCS | Performed by: PHYSICIAN ASSISTANT

## 2025-05-26 RX ORDER — GADOBUTROL 604.72 MG/ML
10 INJECTION INTRAVENOUS
Status: COMPLETED | OUTPATIENT
Start: 2025-05-26 | End: 2025-05-26

## 2025-05-26 RX ADMIN — GADOBUTROL 10 ML: 604.72 INJECTION INTRAVENOUS at 09:20

## 2025-06-03 ENCOUNTER — HOSPITAL ENCOUNTER (OUTPATIENT)
Dept: CT IMAGING | Facility: HOSPITAL | Age: 60
Discharge: HOME | End: 2025-06-03
Attending: INTERNAL MEDICINE
Payer: COMMERCIAL

## 2025-06-03 DIAGNOSIS — I10 ESSENTIAL HYPERTENSION: ICD-10-CM

## 2025-06-03 DIAGNOSIS — R07.9 CHEST PAIN, UNSPECIFIED TYPE: ICD-10-CM

## 2025-06-03 DIAGNOSIS — E78.1 HYPERTRIGLYCERIDEMIA: ICD-10-CM

## 2025-06-03 PROCEDURE — 75571 CT HRT W/O DYE W/CA TEST: CPT

## 2025-06-04 LAB
EST. AVERAGE GLUCOSE BLD GHB EST-MCNC: 120 MG/DL
HBA1C MFR BLD: 5.8 % (ref 4.8–5.6)
URATE SERPL-MCNC: 6 MG/DL (ref 3.8–8.4)

## 2025-06-05 LAB
A1AT PHENOTYP SERPL IFE: NORMAL
A1AT SERPL-MCNC: 128 MG/DL (ref 101–187)
ALBUMIN SERPL-MCNC: 4.2 G/DL (ref 3.8–4.9)
ALP SERPL-CCNC: 56 IU/L (ref 44–121)
ALT SERPL-CCNC: 31 IU/L (ref 0–44)
AST SERPL-CCNC: 36 IU/L (ref 0–40)
BASOPHILS # BLD AUTO: 0 X10E3/UL (ref 0–0.2)
BASOPHILS NFR BLD AUTO: 1 %
BILIRUB SERPL-MCNC: 0.2 MG/DL (ref 0–1.2)
BUN SERPL-MCNC: 19 MG/DL (ref 6–24)
BUN/CREAT SERPL: 18 (ref 9–20)
CALCIUM SERPL-MCNC: 9 MG/DL (ref 8.7–10.2)
CHLORIDE SERPL-SCNC: 100 MMOL/L (ref 96–106)
CO2 SERPL-SCNC: 21 MMOL/L (ref 20–29)
CREAT SERPL-MCNC: 1.08 MG/DL (ref 0.76–1.27)
EGFR: 79 ML/MIN/1.73
EOSINOPHIL # BLD AUTO: 0.1 X10E3/UL (ref 0–0.4)
EOSINOPHIL NFR BLD AUTO: 3 %
ERYTHROCYTE [DISTWIDTH] IN BLOOD BY AUTOMATED COUNT: 13.7 % (ref 11.6–15.4)
FERRITIN SERPL-MCNC: 249 NG/ML (ref 30–400)
GLOBULIN SER-MCNC: 2.4 G/DL (ref 1.5–4.5)
GLUCOSE SERPL-MCNC: 100 MG/DL (ref 70–99)
HCT VFR BLD AUTO: 45.5 % (ref 37.5–51)
HGB BLD-MCNC: 14.5 G/DL (ref 13–17.7)
IMM GRANULOCYTES # BLD: 0 X10E3/UL (ref 0–0.1)
IMM GRANULOCYTES NFR BLD: 0 %
IRON SATN MFR SERPL: 14 % (ref 15–55)
IRON SERPL-MCNC: 49 UG/DL (ref 38–169)
LYMPHOCYTES # BLD AUTO: 2.7 X10E3/UL (ref 0.7–3.1)
LYMPHOCYTES NFR BLD AUTO: 53 %
MCH RBC QN AUTO: 30 PG (ref 26.6–33)
MCHC RBC AUTO-ENTMCNC: 31.9 G/DL (ref 31.5–35.7)
MCV RBC AUTO: 94 FL (ref 79–97)
MONOCYTES # BLD AUTO: 0.4 X10E3/UL (ref 0.1–0.9)
MONOCYTES NFR BLD AUTO: 9 %
NEUTROPHILS # BLD AUTO: 1.7 X10E3/UL (ref 1.4–7)
NEUTROPHILS NFR BLD AUTO: 34 %
PLATELET # BLD AUTO: 156 X10E3/UL (ref 150–450)
POTASSIUM SERPL-SCNC: 4.1 MMOL/L (ref 3.5–5.2)
PROT SERPL-MCNC: 6.6 G/DL (ref 6–8.5)
RBC # BLD AUTO: 4.83 X10E6/UL (ref 4.14–5.8)
SODIUM SERPL-SCNC: 139 MMOL/L (ref 134–144)
TIBC SERPL-MCNC: 354 UG/DL (ref 250–450)
UIBC SERPL-MCNC: 305 UG/DL (ref 111–343)
WBC # BLD AUTO: 4.9 X10E3/UL (ref 3.4–10.8)

## 2025-06-09 ENCOUNTER — HOSPITAL ENCOUNTER (OUTPATIENT)
Dept: GASTROENTEROLOGY | Facility: AMBULARY SURGERY CENTER | Age: 60
Setting detail: OUTPATIENT SURGERY
Discharge: HOME | End: 2025-06-09
Attending: PHYSICIAN ASSISTANT
Payer: COMMERCIAL

## 2025-06-09 ENCOUNTER — RESULTS FOLLOW-UP (OUTPATIENT)
Dept: NON INVASIVE DIAGNOSTICS | Facility: HOSPITAL | Age: 60
End: 2025-06-09

## 2025-06-09 ENCOUNTER — ANESTHESIA EVENT (OUTPATIENT)
Dept: GASTROENTEROLOGY | Facility: AMBULARY SURGERY CENTER | Age: 60
End: 2025-06-09
Payer: COMMERCIAL

## 2025-06-09 VITALS
RESPIRATION RATE: 16 BRPM | OXYGEN SATURATION: 98 % | HEART RATE: 58 BPM | TEMPERATURE: 97.5 F | DIASTOLIC BLOOD PRESSURE: 76 MMHG | SYSTOLIC BLOOD PRESSURE: 141 MMHG

## 2025-06-09 DIAGNOSIS — Z86.0100 HISTORY OF COLON POLYPS: ICD-10-CM

## 2025-06-09 PROCEDURE — 88305 TISSUE EXAM BY PATHOLOGIST: CPT | Performed by: PATHOLOGY

## 2025-06-09 RX ORDER — SODIUM CHLORIDE, SODIUM LACTATE, POTASSIUM CHLORIDE, CALCIUM CHLORIDE 600; 310; 30; 20 MG/100ML; MG/100ML; MG/100ML; MG/100ML
INJECTION, SOLUTION INTRAVENOUS CONTINUOUS PRN
Status: DISCONTINUED | OUTPATIENT
Start: 2025-06-09 | End: 2025-06-09

## 2025-06-09 RX ORDER — PROPOFOL 10 MG/ML
INJECTION, EMULSION INTRAVENOUS AS NEEDED
Status: DISCONTINUED | OUTPATIENT
Start: 2025-06-09 | End: 2025-06-09

## 2025-06-09 RX ORDER — LIDOCAINE HYDROCHLORIDE 10 MG/ML
INJECTION, SOLUTION EPIDURAL; INFILTRATION; INTRACAUDAL; PERINEURAL AS NEEDED
Status: DISCONTINUED | OUTPATIENT
Start: 2025-06-09 | End: 2025-06-09

## 2025-06-09 RX ORDER — PROPOFOL 10 MG/ML
INJECTION, EMULSION INTRAVENOUS CONTINUOUS PRN
Status: DISCONTINUED | OUTPATIENT
Start: 2025-06-09 | End: 2025-06-09

## 2025-06-09 RX ADMIN — PROPOFOL 100 MG: 10 INJECTION, EMULSION INTRAVENOUS at 10:57

## 2025-06-09 RX ADMIN — LIDOCAINE HYDROCHLORIDE 50 MG: 10 INJECTION, SOLUTION EPIDURAL; INFILTRATION; INTRACAUDAL; PERINEURAL at 10:57

## 2025-06-09 RX ADMIN — PROPOFOL 40 MG: 10 INJECTION, EMULSION INTRAVENOUS at 11:00

## 2025-06-09 RX ADMIN — PROPOFOL 120 MCG/KG/MIN: 10 INJECTION, EMULSION INTRAVENOUS at 10:57

## 2025-06-09 RX ADMIN — SODIUM CHLORIDE, SODIUM LACTATE, POTASSIUM CHLORIDE, AND CALCIUM CHLORIDE: .6; .31; .03; .02 INJECTION, SOLUTION INTRAVENOUS at 10:56

## 2025-06-09 NOTE — ANESTHESIA PREPROCEDURE EVALUATION
Procedure:  COLONOSCOPY    Relevant Problems   CARDIO   (+) Hypertriglyceridemia      GI/HEPATIC   (+) Hepatic steatosis      MUSCULOSKELETAL   (+) Gout        Physical Exam    Airway     Mallampati score: I  TM Distance: >3 FB  Neck ROM: full      Cardiovascular      Dental        Pulmonary      Neurological    He appears awake, alert and oriented x3.      Other Findings        Anesthesia Plan  ASA Score- 2     Anesthesia Type- IV sedation with anesthesia with ASA Monitors.         Additional Monitors:     Airway Plan:            Plan Factors-Exercise tolerance (METS): >4 METS.    Chart reviewed.    Patient summary reviewed.    Patient is not a current smoker.  Patient did not smoke on day of surgery.            Induction- intravenous.    Postoperative Plan- .   Monitoring Plan - Monitoring plan - standard ASA monitoring      Perioperative Resuscitation Plan - Level 1 - Full Code.       Informed Consent- Anesthetic plan and risks discussed with patient.  I personally reviewed this patient with the CRNA. Discussed and agreed on the Anesthesia Plan with the CRNA..      NPO Status:  Vitals Value Taken Time   Date of last liquid 06/09/25 06/09/25 10:15   Time of last liquid 0630 06/09/25 10:15   Date of last solid 06/08/25 06/09/25 10:15   Time of last solid 1400 06/09/25 10:15

## 2025-06-09 NOTE — H&P
History and Physical - [facility]  Yousuf Anna 60 y.o. male MRN: [id number]                  HPI: Yousuf Anna is a 60 y.o. year old male who presents for history of polyp      REVIEW OF SYSTEMS: Per the HPI, and otherwise unremarkable.    Historical Information   Past Medical History[1]  Past Surgical History[2]  Social History   Social History     Substance and Sexual Activity   Alcohol Use Yes    Alcohol/week: 1.0 standard drink of alcohol    Types: 1 Glasses of wine per week    Comment: 0-2 drinks per wk      Social History     Substance and Sexual Activity   Drug Use No     Tobacco Use History[3]  Family History[4]    Meds/Allergies     Current Medications[5]    Allergies[6]    Objective     /82   Pulse (!) 54   Temp 97.5 °F (36.4 °C) (Temporal)   Resp 16   SpO2 98%       PHYSICAL EXAM    Gen: NAD  Head: NCAT  CV: RRR  CHEST: Clear  ABD: soft, NT/ND  EXT: no edema      ASSESSMENT/PLAN:  This is a 60 y.o. year old male here for colonoscopy, and he is stable and optimized for his procedure.             [1]   Past Medical History:  Diagnosis Date    Gout     Hypertension     Male erectile dysfunction     [id number] RESOLVED    Wrist fracture     LEFT   [2]   Past Surgical History:  Procedure Laterality Date    COLONOSCOPY N/A 6/10/2016    Procedure: COLONOSCOPY;  Surgeon: Pardeep Garsia MD;  Location: [facility];  Service:     KNEE ARTHROSCOPY Left     WITH MEDIAL MENISCETOMY    CO ARTHRS KNE SURG W/MENISCECTOMY MED/LAT W/SHVG Right 8/18/2016    Procedure: KNEE ARTHROSCOPIC PARTIAL MEDIAL MENISCECTOMY ;  Surgeon: Cali Vazquez MD;  Location: [facility];  Service: Orthopedics    VASECTOMY      WITH REVERSAL   [3]   Social History  Tobacco Use   Smoking Status Never   Smokeless Tobacco Never   [4]   Family History  Problem Relation Name Age of Onset    Coronary artery disease Mother      Gout Mother      Hypertension Mother      Uterine cancer Mother      Gout Father       Hypertension Father      Benign prostatic hyperplasia Father      Gout Brother      Diabetes Daughter      Gout Daughter      Coronary artery disease Maternal Grandmother      Hypertension Maternal Grandmother     [5]   Current Outpatient Medications:     allopurinol (ZYLOPRIM) 300 mg tablet    multivitamins-fortified A-D-K (SOURCECF) solution    lisinopril (ZESTRIL) 10 mg tablet    lisinopril (ZESTRIL) 5 mg tablet  [6] No Known Allergies

## 2025-06-09 NOTE — ANESTHESIA POSTPROCEDURE EVALUATION
Post-Op Assessment Note    CV Status:  Stable  Pain Score: 0    Pain management: adequate       Mental Status:  Arousable and sleepy   Hydration Status:  Stable   PONV Controlled:  None   Airway Patency:  Patent  Two or more mitigation strategies used for obstructive sleep apnea   Post Op Vitals Reviewed: Yes    No anethesia notable event occurred.    Staff: CRNA           Last Filed PACU Vitals:  Vitals Value Taken Time   Temp     Pulse 74    /67    Resp 14    SpO2 99

## 2025-06-11 PROCEDURE — 88305 TISSUE EXAM BY PATHOLOGIST: CPT | Performed by: PATHOLOGY

## 2025-06-12 ENCOUNTER — RESULTS FOLLOW-UP (OUTPATIENT)
Dept: GASTROENTEROLOGY | Facility: CLINIC | Age: 60
End: 2025-06-12

## 2025-07-02 ENCOUNTER — OFFICE VISIT (OUTPATIENT)
Dept: UROLOGY | Facility: CLINIC | Age: 60
End: 2025-07-02

## 2025-07-02 VITALS
BODY MASS INDEX: 31.36 KG/M2 | DIASTOLIC BLOOD PRESSURE: 90 MMHG | OXYGEN SATURATION: 98 % | HEIGHT: 71 IN | SYSTOLIC BLOOD PRESSURE: 142 MMHG | WEIGHT: 224 LBS | HEART RATE: 61 BPM

## 2025-07-02 DIAGNOSIS — R35.0 BENIGN PROSTATIC HYPERPLASIA WITH URINARY FREQUENCY: Primary | ICD-10-CM

## 2025-07-02 DIAGNOSIS — Z12.5 SCREENING FOR PROSTATE CANCER: ICD-10-CM

## 2025-07-02 DIAGNOSIS — N40.1 BENIGN PROSTATIC HYPERPLASIA WITH URINARY FREQUENCY: Primary | ICD-10-CM

## 2025-07-02 DIAGNOSIS — N52.9 ERECTILE DYSFUNCTION, UNSPECIFIED ERECTILE DYSFUNCTION TYPE: ICD-10-CM

## 2025-07-02 LAB — POST-VOID RESIDUAL VOLUME, ML POC: 17 ML

## 2025-07-02 RX ORDER — TADALAFIL 10 MG/1
10 TABLET ORAL DAILY PRN
Qty: 30 TABLET | Refills: 6 | Status: SHIPPED | OUTPATIENT
Start: 2025-07-02

## 2025-07-02 NOTE — PROGRESS NOTES
"Name: Yousuf Anna      : 1965      MRN: [id number]  Encounter Provider: Colin Aguero PA-C  Encounter Date: 2025   Encounter department: [facility]  :  Assessment & Plan  Erectile dysfunction, unspecified erectile dysfunction type  Tadalafil 10 mg helps.  Gets slight HA and facial flushing.  Wants to continue to use on occ.   -OK to continue with the tadalafil 10mg PRN.  SED: ha, facial flushing, back pain, CP, sob   Orders:    tadalafil (CIALIS) 10 MG tablet; Take 1 tablet (10 mg total) by mouth daily as needed for erectile dysfunction    Screening for prostate cancer  2018 PSA 0.3  2025 PSA 0.3  No FH of prostate cancer  25 benign feeling CLARITA   -f/u 1 year for CLARITA    -PSA by PCP            History of Present Illness   Yousuf Anna is a 60 y.o. male with erectile dysfunction I was given a trial of tadalafil 10 mg as well as some symptoms of BPH for which he deferred medical management at last visit.  Currently presents for follow-up.  Notes a decent urinary stream, no dysuria, no hematuria. Nocturia x 0 unless drinks fluids closer to bed time.          Radiology    Labs   2016 PSA 0.3  02/15/2017 PSA 0.2  2018 PSA 0.3  2025 PSA 0.3    Past Medical History  Hepatic steatosis  Gout    Past  History  ED  tadalafil 10mg.  Gets slight HA and facial flushing.  Tadalafil 5mg not as helpful.     Past  surgical history   Vasectomy    Prior Visits             Objective   /90 (BP Location: Left arm, Patient Position: Sitting, Cuff Size: Adult)   Pulse 61   Ht 5' 11\" (1.803 m)   Wt 102 kg (224 lb)   SpO2 98%   BMI 31.24 kg/m²     Physical Exam  HENT:      Head: Normocephalic.   Pulmonary:      Effort: Pulmonary effort is normal.   Genitourinary:     Comments: Prostate  Grade I-II  Soft, NT  No nodules     Psychiatric:         Mood and Affect: Mood normal.           Results   Lab Results   Component Value Date    PSA 0.3 " 02/13/2025    PSA 0.3 12/21/2018     Lab Results   Component Value Date    CALCIUM 9.0 02/15/2017     02/15/2017    K 4.1 06/03/2025    CO2 21 06/03/2025     06/03/2025    BUN 19 06/03/2025    CREATININE 1.08 06/03/2025     Lab Results   Component Value Date    WBC 4.9 06/03/2025    HGB 14.5 06/03/2025    HCT 45.5 06/03/2025    MCV 94 06/03/2025     06/03/2025       Office Urine Dip  Recent Results (from the past hour)   POCT Measure PVR    Collection Time: 07/02/25  1:22 PM   Result Value Ref Range    POST-VOID RESIDUAL VOLUME, ML POC 17 mL

## 2025-08-17 DIAGNOSIS — E79.0 HYPERURICEMIA: ICD-10-CM

## 2025-08-17 DIAGNOSIS — M1A.9XX0 CHRONIC GOUT WITHOUT TOPHUS, UNSPECIFIED CAUSE, UNSPECIFIED SITE: ICD-10-CM

## 2025-08-19 RX ORDER — ALLOPURINOL 300 MG/1
300 TABLET ORAL DAILY
Qty: 90 TABLET | Refills: 1 | Status: SHIPPED | OUTPATIENT
Start: 2025-08-19

## 2025-08-20 ENCOUNTER — TELEPHONE (OUTPATIENT)
Age: 60
End: 2025-08-20